# Patient Record
Sex: FEMALE | Race: WHITE | Employment: OTHER | URBAN - METROPOLITAN AREA
[De-identification: names, ages, dates, MRNs, and addresses within clinical notes are randomized per-mention and may not be internally consistent; named-entity substitution may affect disease eponyms.]

---

## 2018-07-25 ENCOUNTER — OFFICE VISIT (OUTPATIENT)
Dept: CARDIOLOGY CLINIC | Age: 73
End: 2018-07-25
Payer: MEDICARE

## 2018-07-25 VITALS
HEIGHT: 62 IN | HEART RATE: 68 BPM | DIASTOLIC BLOOD PRESSURE: 82 MMHG | RESPIRATION RATE: 20 BRPM | WEIGHT: 206 LBS | BODY MASS INDEX: 37.91 KG/M2 | SYSTOLIC BLOOD PRESSURE: 122 MMHG

## 2018-07-25 DIAGNOSIS — I42.8 NONISCHEMIC CARDIOMYOPATHY (HCC): ICD-10-CM

## 2018-07-25 DIAGNOSIS — Z95.810 ICD (IMPLANTABLE CARDIOVERTER-DEFIBRILLATOR) IN PLACE: ICD-10-CM

## 2018-07-25 DIAGNOSIS — I25.10 CORONARY ARTERY DISEASE WITHOUT ANGINA PECTORIS, UNSPECIFIED VESSEL OR LESION TYPE, UNSPECIFIED WHETHER NATIVE OR TRANSPLANTED HEART: Primary | ICD-10-CM

## 2018-07-25 DIAGNOSIS — Z95.5 PRESENCE OF DRUG COATED STENT IN LAD CORONARY ARTERY: ICD-10-CM

## 2018-07-25 PROCEDURE — 99203 OFFICE O/P NEW LOW 30 MIN: CPT | Performed by: INTERNAL MEDICINE

## 2018-07-25 PROCEDURE — 1036F TOBACCO NON-USER: CPT | Performed by: INTERNAL MEDICINE

## 2018-07-25 PROCEDURE — 1090F PRES/ABSN URINE INCON ASSESS: CPT | Performed by: INTERNAL MEDICINE

## 2018-07-25 PROCEDURE — G8400 PT W/DXA NO RESULTS DOC: HCPCS | Performed by: INTERNAL MEDICINE

## 2018-07-25 PROCEDURE — G8598 ASA/ANTIPLAT THER USED: HCPCS | Performed by: INTERNAL MEDICINE

## 2018-07-25 PROCEDURE — G8417 CALC BMI ABV UP PARAM F/U: HCPCS | Performed by: INTERNAL MEDICINE

## 2018-07-25 PROCEDURE — G8427 DOCREV CUR MEDS BY ELIG CLIN: HCPCS | Performed by: INTERNAL MEDICINE

## 2018-07-25 PROCEDURE — 3017F COLORECTAL CA SCREEN DOC REV: CPT | Performed by: INTERNAL MEDICINE

## 2018-07-25 PROCEDURE — 1101F PT FALLS ASSESS-DOCD LE1/YR: CPT | Performed by: INTERNAL MEDICINE

## 2018-07-25 PROCEDURE — 1123F ACP DISCUSS/DSCN MKR DOCD: CPT | Performed by: INTERNAL MEDICINE

## 2018-07-25 PROCEDURE — 93000 ELECTROCARDIOGRAM COMPLETE: CPT | Performed by: INTERNAL MEDICINE

## 2018-07-25 PROCEDURE — 4040F PNEUMOC VAC/ADMIN/RCVD: CPT | Performed by: INTERNAL MEDICINE

## 2018-07-25 RX ORDER — PANTOPRAZOLE SODIUM 40 MG/1
40 TABLET, DELAYED RELEASE ORAL DAILY
COMMUNITY

## 2018-07-25 RX ORDER — SPIRONOLACTONE 25 MG/1
TABLET ORAL
Refills: 0 | COMMUNITY
Start: 2018-07-20 | End: 2021-03-26

## 2018-07-25 RX ORDER — LOSARTAN POTASSIUM 50 MG/1
TABLET ORAL
Refills: 0 | COMMUNITY
Start: 2018-07-20 | End: 2020-03-11

## 2018-07-25 RX ORDER — METOPROLOL SUCCINATE 50 MG/1
50 TABLET, EXTENDED RELEASE ORAL DAILY
COMMUNITY
End: 2021-01-06

## 2018-07-25 RX ORDER — ALBUTEROL SULFATE 1.25 MG/3ML
1 SOLUTION RESPIRATORY (INHALATION) EVERY 6 HOURS PRN
COMMUNITY

## 2018-07-25 RX ORDER — DULOXETIN HYDROCHLORIDE 20 MG/1
20 CAPSULE, DELAYED RELEASE ORAL DAILY
COMMUNITY
End: 2021-03-26

## 2018-07-25 RX ORDER — ROSUVASTATIN CALCIUM 40 MG/1
20 TABLET, COATED ORAL EVERY EVENING
COMMUNITY

## 2018-07-25 RX ORDER — LEVOTHYROXINE SODIUM 0.05 MG/1
75 TABLET ORAL DAILY
COMMUNITY

## 2018-07-25 RX ORDER — MONTELUKAST SODIUM 10 MG/1
10 TABLET ORAL NIGHTLY
COMMUNITY
End: 2021-03-26

## 2018-07-25 RX ORDER — FUROSEMIDE 40 MG/1
40 TABLET ORAL DAILY
Qty: 60 TABLET | Refills: 3 | Status: SHIPPED | OUTPATIENT
Start: 2018-07-25 | End: 2021-04-20

## 2018-07-25 NOTE — PATIENT INSTRUCTIONS
Patient Education        Eating Healthy Foods: Care Instructions  Your Care Instructions    Eating healthy foods can help lower your risk for disease. Healthy food gives you energy and keeps your heart strong, your brain active, your muscles working, and your bones strong. A healthy diet includes a variety of foods from the basic food groups: grains, vegetables, fruits, milk and milk products, and meat and beans. Some people may eat more of their favorite foods from only one food group and, as a result, miss getting the nutrients they need. So, it is important to pay attention not only to what you eat but also to what you are missing from your diet. You can eat a healthy, balanced diet by making a few small changes. Follow-up care is a key part of your treatment and safety. Be sure to make and go to all appointments, and call your doctor if you are having problems. It's also a good idea to know your test results and keep a list of the medicines you take. How can you care for yourself at home? Look at what you eat  · Keep a food diary for a week or two and record everything you eat or drink. Track the number of servings you eat from each food group. · For a balanced diet every day, eat a variety of:  ¨ 6 or more ounce-equivalents of grains, such as cereals, breads, crackers, rice, or pasta, every day. An ounce-equivalent is 1 slice of bread, 1 cup of ready-to-eat cereal, or ½ cup of cooked rice, cooked pasta, or cooked cereal.  ¨ 2½ cups of vegetables, especially:  § Dark-green vegetables such as broccoli and spinach. § Orange vegetables such as carrots and sweet potatoes. § Dry beans (such as albert and kidney beans) and peas (such as lentils). ¨ 2 cups of fresh, frozen, or canned fruit. A small apple or 1 banana or orange equals 1 cup. ¨ 3 cups of nonfat or low-fat milk, yogurt, or other milk products. ¨ 5½ ounces of meat and beans, such as chicken, fish, lean meat, beans, nuts, and seeds.  One egg, 1 tablespoon of peanut butter, ½ ounce nuts or seeds, or ¼ cup of cooked beans equals 1 ounce of meat. · Learn how to read food labels for serving sizes and ingredients. Fast-food and convenience-food meals often contain few or no fruits or vegetables. Make sure you eat some fruits and vegetables to make the meal more nutritious. · Look at your food diary. For each food group, add up what you have eaten and then divide the total by the number of days. This will give you an idea of how much you are eating from each food group. See if you can find some ways to change your diet to make it more healthy. Start small  · Do not try to make dramatic changes to your diet all at once. You might feel that you are missing out on your favorite foods and then be more likely to fail. · Start slowly, and gradually change your habits. Try some of the following:  ¨ Use whole wheat bread instead of white bread. ¨ Use nonfat or low-fat milk instead of whole milk. ¨ Eat brown rice instead of white rice, and eat whole wheat pasta instead of white-flour pasta. ¨ Try low-fat cheeses and low-fat yogurt. ¨ Add more fruits and vegetables to meals and have them for snacks. ¨ Add lettuce, tomato, cucumber, and onion to sandwiches. ¨ Add fruit to yogurt and cereal.  Enjoy food  · You can still eat your favorite foods. You just may need to eat less of them. If your favorite foods are high in fat, salt, and sugar, limit how often you eat them, but do not cut them out entirely. · Eat a wide variety of foods. Make healthy choices when eating out  · The type of restaurant you choose can help you make healthy choices. Even fast-food chains are now offering more low-fat or healthier choices on the menu. · Choose smaller portions, or take half of your meal home. · When eating out, try:  ¨ A veggie pizza with a whole wheat crust or grilled chicken (instead of sausage or pepperoni).   ¨ Pasta with roasted vegetables, grilled chicken, or marinara sauce instead of cream sauce. ¨ A vegetable wrap or grilled chicken wrap. ¨ Broiled or poached food instead of fried or breaded items. Make healthy choices easy  · Buy packaged, prewashed, ready-to-eat fresh vegetables and fruits, such as baby carrots, salad mixes, and chopped or shredded broccoli and cauliflower. · Buy packaged, presliced fruits, such as melon or pineapple. · Choose 100% fruit or vegetable juice instead of soda. Limit juice intake to 4 to 6 oz (½ to ¾ cup) a day. · Blend low-fat yogurt, fruit juice, and canned or frozen fruit to make a smoothie for breakfast or a snack. Where can you learn more? Go to https://[employer]. org and sign in to your [employer] account. Enter R575 in the [employer] box to learn more about \"Eating Healthy Foods: Care Instructions. \"     If you do not have an account, please click on the \"Sign Up Now\" link. Current as of: May 12, 2017  Content Version: 11.6  © 1561-1731 [employer]. Care instructions adapted under license by [facility] [place] ([facility]). If you have questions about a medical condition or this instruction, always ask your healthcare professional. [address] any warranty or liability for your use of this information.

## 2018-07-27 ENCOUNTER — TELEPHONE (OUTPATIENT)
Dept: ADMINISTRATIVE | Age: 73
End: 2018-07-27

## 2018-07-27 NOTE — TELEPHONE ENCOUNTER
New pt per Dr. Katey Santiago scheduled with Dr. Bulmaro Lo for: 8/8/18 - Pt req this day. Records scanned into Epic from [place]. She is s/p MDT ICD and stents. EP History sheet scanned.

## 2018-08-08 ENCOUNTER — OFFICE VISIT (OUTPATIENT)
Dept: NON INVASIVE DIAGNOSTICS | Age: 73
End: 2018-08-08
Payer: MEDICARE

## 2018-08-08 VITALS
WEIGHT: 214 LBS | BODY MASS INDEX: 42.01 KG/M2 | RESPIRATION RATE: 20 BRPM | SYSTOLIC BLOOD PRESSURE: 116 MMHG | HEART RATE: 70 BPM | HEIGHT: 60 IN | DIASTOLIC BLOOD PRESSURE: 72 MMHG

## 2018-08-08 DIAGNOSIS — Z95.810 ICD (IMPLANTABLE CARDIOVERTER-DEFIBRILLATOR) IN PLACE: Primary | ICD-10-CM

## 2018-08-08 PROCEDURE — 93283 PRGRMG EVAL IMPLANTABLE DFB: CPT | Performed by: INTERNAL MEDICINE

## 2018-08-08 PROCEDURE — G8400 PT W/DXA NO RESULTS DOC: HCPCS | Performed by: INTERNAL MEDICINE

## 2018-08-08 PROCEDURE — 1123F ACP DISCUSS/DSCN MKR DOCD: CPT | Performed by: INTERNAL MEDICINE

## 2018-08-08 PROCEDURE — 1090F PRES/ABSN URINE INCON ASSESS: CPT | Performed by: INTERNAL MEDICINE

## 2018-08-08 PROCEDURE — 93290 INTERROG DEV EVAL ICPMS IP: CPT | Performed by: INTERNAL MEDICINE

## 2018-08-08 PROCEDURE — G8427 DOCREV CUR MEDS BY ELIG CLIN: HCPCS | Performed by: INTERNAL MEDICINE

## 2018-08-08 PROCEDURE — 4040F PNEUMOC VAC/ADMIN/RCVD: CPT | Performed by: INTERNAL MEDICINE

## 2018-08-08 PROCEDURE — G8417 CALC BMI ABV UP PARAM F/U: HCPCS | Performed by: INTERNAL MEDICINE

## 2018-08-08 PROCEDURE — G8598 ASA/ANTIPLAT THER USED: HCPCS | Performed by: INTERNAL MEDICINE

## 2018-08-08 PROCEDURE — 3017F COLORECTAL CA SCREEN DOC REV: CPT | Performed by: INTERNAL MEDICINE

## 2018-08-08 PROCEDURE — 1101F PT FALLS ASSESS-DOCD LE1/YR: CPT | Performed by: INTERNAL MEDICINE

## 2018-08-08 PROCEDURE — 1036F TOBACCO NON-USER: CPT | Performed by: INTERNAL MEDICINE

## 2018-08-08 PROCEDURE — 99203 OFFICE O/P NEW LOW 30 MIN: CPT | Performed by: INTERNAL MEDICINE

## 2018-08-08 RX ORDER — CALCIUM CARBONATE 300MG(750)
1 TABLET,CHEWABLE ORAL DAILY
COMMUNITY
End: 2020-03-11

## 2018-08-08 NOTE — PROGRESS NOTES
date: 1971    Smokeless tobacco: Never Used    Alcohol use Yes      Comment: occa./ denies caffeine       Current Outpatient Prescriptions   Medication Sig Dispense Refill    Magnesium 400 MG TABS Take 1 tablet by mouth daily      NONFORMULARY Take 1 tablet by mouth daily      losartan (COZAAR) 50 MG tablet TK 1 T PO QD  0    spironolactone (ALDACTONE) 25 MG tablet TK 1 T PO QD  0    pantoprazole (PROTONIX) 40 MG tablet Take 40 mg by mouth daily      montelukast (SINGULAIR) 10 MG tablet Take 10 mg by mouth nightly      levothyroxine (SYNTHROID) 50 MCG tablet Take 50 mcg by mouth Daily      rosuvastatin (CRESTOR) 40 MG tablet Take 40 mg by mouth every evening      aspirin 81 MG tablet Take 81 mg by mouth daily      albuterol (ACCUNEB) 1.25 MG/3ML nebulizer solution Inhale 1 ampule into the lungs every 6 hours as needed for Wheezing      Multiple Vitamins-Minerals (MULTIVITAMIN ADULT PO) Take by mouth daily      OXYGEN Inhale 5 L into the lungs daily      Omega-3 300 MG CAPS Take by mouth daily      DULoxetine (CYMBALTA) 20 MG extended release capsule Take 20 mg by mouth daily      metoprolol succinate (TOPROL XL) 50 MG extended release tablet Take 50 mg by mouth daily      furosemide (LASIX) 40 MG tablet Take 1 tablet by mouth daily Takes every other day 60 tablet 3     No current facility-administered medications for this visit. Allergies   Allergen Reactions    Lisinopril Swelling       ROS:   Constitutional: Negative for fever, activity change and appetite change. HENT: Negative for nosebleeds. Eyes: Negative for visual disturbance. Respiratory: Negative for cough, chest tightness, + shortness of breath and - wheezing. Cardiovascular: per HPI  Gastrointestinal: Negative for abdominal pain and blood in stool. Genitourinary: Negative for hematuria and difficulty urinating. Musculoskeletal: Negative for myalgias and gait problem. Skin: Negative for color change and rash. Neurological: Negative for syncope and light-headedness. Psychiatric/Behavioral: Negative for confusion and agitation. The patient is not nervous/anxious. Heme: no bleeding disorders, no melena or hematochezia      PHYSICAL EXAM:  Constitutional   Vitals:    18 1426   BP: 116/72   Pulse: 70   Resp: 20   Weight: 214 lb (97.1 kg)   Height: 5' (1.524 m)    Well-developed, no acute distress  Eyes: conjunctivae normal, no xanthelasma   Ears, Nose, Throat: oral mucosa moist, no cyanosis   Neck: supple, no JVD, no bruits, no thyromegaly   CV: normal rate, regular rhythm,  no murmurs, rubs, or gallops. Peripheral pulses normal   Lungs: clear to auscultation bilaterally, normal respiratory effort, no wheezes  Abdomen: soft, non-tender, bowel sounds present, no masses or hepatomegaly   Extremities: no digital clubbing, no edema   Skin: warm, no rashes   Neuro/Psych: A&O x 3, normal mood and affect  Device site: looks well healed and free from infection or erosion    Data:    No results for input(s): WBC, HGB, HCT, PLT in the last 72 hours. No results for input(s): NA, K, CL, CO2, BUN, CREATININE, CALCIUM in the last 72 hours. Invalid input(s): GLU  No results for input(s): INR in the last 72 hours. No results for input(s): TSH in the last 72 hours. 18: Device Interrogation: Battery life: 6.6 years, charge time: 3.7 seconds, Atrial pacin.8%, RV pacing: <1%, Episodes: 1 AF episode on 10/12/17-lasting 28 seconds, Vrate 153 bpm, AF burden: <1%. Programming device evaluation (in person) with iterative adjustment of the implantable device was performed to test the function of the device and to select optimal permanent programmed values with analysis, review and report, and I personally supervised this procedure and reviewed all data today. Impression:     1. Ischemic cardiomyopathy  - GDMT per cardiology  - Following with Dr. Elder López    2.  BiV ICD in-situ  - ICD implantation - - generator

## 2018-08-10 ENCOUNTER — TELEPHONE (OUTPATIENT)
Dept: NON INVASIVE DIAGNOSTICS | Age: 73
End: 2018-08-10

## 2018-08-10 NOTE — TELEPHONE ENCOUNTER
Spoke with Panda Santiago from [facility] device [facility], he transferred patient to our clinic. Accepted transfer in to our clinic. Spoke with patient. Informed her that she is now transferred in our clinic. Patient hasn't had her monitor plugged in since 12/2017. Patient instructed to hook up monitor and send a test transmission at her convenience . Patient voiced understanding.      Chau Wallace

## 2018-08-13 ENCOUNTER — TELEPHONE (OUTPATIENT)
Dept: NON INVASIVE DIAGNOSTICS | Age: 73
End: 2018-08-13

## 2018-11-07 ENCOUNTER — NURSE ONLY (OUTPATIENT)
Dept: NON INVASIVE DIAGNOSTICS | Age: 73
End: 2018-11-07
Payer: MEDICARE

## 2018-11-07 DIAGNOSIS — Z95.810 ICD (IMPLANTABLE CARDIOVERTER-DEFIBRILLATOR) IN PLACE: Primary | ICD-10-CM

## 2018-11-07 DIAGNOSIS — I42.8 NONISCHEMIC CARDIOMYOPATHY (HCC): ICD-10-CM

## 2018-11-07 PROCEDURE — 93296 REM INTERROG EVL PM/IDS: CPT | Performed by: INTERNAL MEDICINE

## 2018-11-07 PROCEDURE — 93297 REM INTERROG DEV EVAL ICPMS: CPT | Performed by: INTERNAL MEDICINE

## 2018-11-07 PROCEDURE — 93295 DEV INTERROG REMOTE 1/2/MLT: CPT | Performed by: INTERNAL MEDICINE

## 2018-11-12 ENCOUNTER — TELEPHONE (OUTPATIENT)
Dept: NON INVASIVE DIAGNOSTICS | Age: 73
End: 2018-11-12

## 2019-01-22 ENCOUNTER — OFFICE VISIT (OUTPATIENT)
Dept: CARDIOLOGY CLINIC | Age: 74
End: 2019-01-22
Payer: MEDICARE

## 2019-01-22 VITALS
HEIGHT: 61 IN | DIASTOLIC BLOOD PRESSURE: 70 MMHG | BODY MASS INDEX: 38.14 KG/M2 | SYSTOLIC BLOOD PRESSURE: 128 MMHG | RESPIRATION RATE: 14 BRPM | HEART RATE: 93 BPM | WEIGHT: 202 LBS

## 2019-01-22 DIAGNOSIS — I42.8 NONISCHEMIC CARDIOMYOPATHY (HCC): Primary | ICD-10-CM

## 2019-01-22 DIAGNOSIS — Z95.5 PRESENCE OF DRUG COATED STENT IN LAD CORONARY ARTERY: ICD-10-CM

## 2019-01-22 DIAGNOSIS — Z95.810 ICD (IMPLANTABLE CARDIOVERTER-DEFIBRILLATOR) IN PLACE: ICD-10-CM

## 2019-01-22 PROCEDURE — 1090F PRES/ABSN URINE INCON ASSESS: CPT | Performed by: INTERNAL MEDICINE

## 2019-01-22 PROCEDURE — G8484 FLU IMMUNIZE NO ADMIN: HCPCS | Performed by: INTERNAL MEDICINE

## 2019-01-22 PROCEDURE — 4040F PNEUMOC VAC/ADMIN/RCVD: CPT | Performed by: INTERNAL MEDICINE

## 2019-01-22 PROCEDURE — 3017F COLORECTAL CA SCREEN DOC REV: CPT | Performed by: INTERNAL MEDICINE

## 2019-01-22 PROCEDURE — 93000 ELECTROCARDIOGRAM COMPLETE: CPT | Performed by: INTERNAL MEDICINE

## 2019-01-22 PROCEDURE — 99213 OFFICE O/P EST LOW 20 MIN: CPT | Performed by: INTERNAL MEDICINE

## 2019-01-22 PROCEDURE — G8417 CALC BMI ABV UP PARAM F/U: HCPCS | Performed by: INTERNAL MEDICINE

## 2019-01-22 PROCEDURE — G8400 PT W/DXA NO RESULTS DOC: HCPCS | Performed by: INTERNAL MEDICINE

## 2019-01-22 PROCEDURE — 1036F TOBACCO NON-USER: CPT | Performed by: INTERNAL MEDICINE

## 2019-01-22 PROCEDURE — 1101F PT FALLS ASSESS-DOCD LE1/YR: CPT | Performed by: INTERNAL MEDICINE

## 2019-01-22 PROCEDURE — 1123F ACP DISCUSS/DSCN MKR DOCD: CPT | Performed by: INTERNAL MEDICINE

## 2019-01-22 PROCEDURE — G8427 DOCREV CUR MEDS BY ELIG CLIN: HCPCS | Performed by: INTERNAL MEDICINE

## 2019-02-06 ENCOUNTER — NURSE ONLY (OUTPATIENT)
Dept: NON INVASIVE DIAGNOSTICS | Age: 74
End: 2019-02-06
Payer: MEDICARE

## 2019-02-06 DIAGNOSIS — Z95.810 ICD (IMPLANTABLE CARDIOVERTER-DEFIBRILLATOR) IN PLACE: Primary | ICD-10-CM

## 2019-02-06 PROCEDURE — 93296 REM INTERROG EVL PM/IDS: CPT | Performed by: INTERNAL MEDICINE

## 2019-02-06 PROCEDURE — 93295 DEV INTERROG REMOTE 1/2/MLT: CPT | Performed by: INTERNAL MEDICINE

## 2019-05-08 ENCOUNTER — HOSPITAL ENCOUNTER (OUTPATIENT)
Age: 74
Setting detail: OUTPATIENT SURGERY
Discharge: HOME | End: 2019-05-08
Attending: SURGERY | Admitting: SURGERY
Payer: MEDICARE

## 2019-05-08 ENCOUNTER — NURSE ONLY (OUTPATIENT)
Dept: NON INVASIVE DIAGNOSTICS | Age: 74
End: 2019-05-08
Payer: MEDICARE

## 2019-05-08 VITALS
DIASTOLIC BLOOD PRESSURE: 53 MMHG | BODY MASS INDEX: 38.51 KG/M2 | WEIGHT: 204 LBS | SYSTOLIC BLOOD PRESSURE: 134 MMHG | TEMPERATURE: 98 F | OXYGEN SATURATION: 100 % | HEIGHT: 61 IN | HEART RATE: 76 BPM | RESPIRATION RATE: 16 BRPM

## 2019-05-08 DIAGNOSIS — M65.341 TRIGGER RING FINGER OF RIGHT HAND: Primary | ICD-10-CM

## 2019-05-08 DIAGNOSIS — I50.22 CHRONIC SYSTOLIC HEART FAILURE (HCC): ICD-10-CM

## 2019-05-08 DIAGNOSIS — I42.8 NONISCHEMIC CARDIOMYOPATHY (HCC): ICD-10-CM

## 2019-05-08 DIAGNOSIS — Z95.810 ICD (IMPLANTABLE CARDIOVERTER-DEFIBRILLATOR) IN PLACE: Primary | ICD-10-CM

## 2019-05-08 PROCEDURE — 3600000002 HC SURGERY LEVEL 2 BASE: Performed by: SURGERY

## 2019-05-08 PROCEDURE — 93297 REM INTERROG DEV EVAL ICPMS: CPT | Performed by: INTERNAL MEDICINE

## 2019-05-08 PROCEDURE — 2500000003 HC RX 250 WO HCPCS: Performed by: SURGERY

## 2019-05-08 PROCEDURE — 93295 DEV INTERROG REMOTE 1/2/MLT: CPT | Performed by: INTERNAL MEDICINE

## 2019-05-08 PROCEDURE — 7100000010 HC PHASE II RECOVERY - FIRST 15 MIN: Performed by: SURGERY

## 2019-05-08 PROCEDURE — 88304 TISSUE EXAM BY PATHOLOGIST: CPT

## 2019-05-08 PROCEDURE — 7100000011 HC PHASE II RECOVERY - ADDTL 15 MIN: Performed by: SURGERY

## 2019-05-08 PROCEDURE — 6360000002 HC RX W HCPCS: Performed by: SURGERY

## 2019-05-08 PROCEDURE — 2580000003 HC RX 258: Performed by: SURGERY

## 2019-05-08 PROCEDURE — 93296 REM INTERROG EVL PM/IDS: CPT | Performed by: INTERNAL MEDICINE

## 2019-05-08 PROCEDURE — 3600000012 HC SURGERY LEVEL 2 ADDTL 15MIN: Performed by: SURGERY

## 2019-05-08 PROCEDURE — 2580000003 HC RX 258: Performed by: ANESTHESIOLOGY

## 2019-05-08 PROCEDURE — 2709999900 HC NON-CHARGEABLE SUPPLY: Performed by: SURGERY

## 2019-05-08 RX ORDER — OXYCODONE HYDROCHLORIDE AND ACETAMINOPHEN 5; 325 MG/1; MG/1
1 TABLET ORAL EVERY 6 HOURS PRN
Qty: 6 TABLET | Refills: 0 | Status: SHIPPED | OUTPATIENT
Start: 2019-05-08 | End: 2019-05-15

## 2019-05-08 RX ORDER — HYDROCODONE BITARTRATE AND ACETAMINOPHEN 5; 325 MG/1; MG/1
1 TABLET ORAL EVERY 4 HOURS PRN
Qty: 6 TABLET | Refills: 0 | Status: SHIPPED | OUTPATIENT
Start: 2019-05-08 | End: 2019-05-15

## 2019-05-08 RX ORDER — SODIUM CHLORIDE, SODIUM LACTATE, POTASSIUM CHLORIDE, CALCIUM CHLORIDE 600; 310; 30; 20 MG/100ML; MG/100ML; MG/100ML; MG/100ML
INJECTION, SOLUTION INTRAVENOUS CONTINUOUS
Status: DISCONTINUED | OUTPATIENT
Start: 2019-05-08 | End: 2019-05-08 | Stop reason: HOSPADM

## 2019-05-08 RX ORDER — CEPHALEXIN 500 MG/1
500 CAPSULE ORAL 3 TIMES DAILY
Qty: 11 CAPSULE | Refills: 0 | Status: SHIPPED | OUTPATIENT
Start: 2019-05-08 | End: 2020-03-11

## 2019-05-08 RX ORDER — CEPHALEXIN 500 MG/1
500 CAPSULE ORAL 3 TIMES DAILY
Qty: 11 CAPSULE | Refills: 0 | Status: SHIPPED | OUTPATIENT
Start: 2019-05-08 | End: 2019-09-19

## 2019-05-08 RX ADMIN — SODIUM CHLORIDE, POTASSIUM CHLORIDE, SODIUM LACTATE AND CALCIUM CHLORIDE: 600; 310; 30; 20 INJECTION, SOLUTION INTRAVENOUS at 09:14

## 2019-05-08 RX ADMIN — CEFAZOLIN 2 G: 1 INJECTION, POWDER, FOR SOLUTION INTRAMUSCULAR; INTRAVENOUS at 09:54

## 2019-05-08 ASSESSMENT — PAIN SCALES - GENERAL
PAINLEVEL_OUTOF10: 0
PAINLEVEL_OUTOF10: 0

## 2019-05-08 ASSESSMENT — PAIN - FUNCTIONAL ASSESSMENT: PAIN_FUNCTIONAL_ASSESSMENT: 0-10

## 2019-05-08 NOTE — BRIEF OP NOTE
Brief Postoperative Note  ______________________________________________________________    Patient: Tiffanie Diggs  YOB: 1945  MRN: [id number]  Date of Procedure: 5/8/2019    Pre-Op Diagnosis: GANGLION OF JOINT/TENDON AND TRIGGER FINGER    Post-Op Diagnosis: Same       Procedure(s):  1. RIGHT RING A1 MASS EXCISION. (CPT 19117  2. RIGHT RING A, PULLEY RELEASE ([id number])    Anesthesia: Anesthesia type not filed in the log.     Surgeon(s):  Jamaal Moy MD.; Hand + Reconstructive Surgery    Assistant: none    Estimated Blood Loss (mL): 3 ml    Complications: None    Specimens:   ID Type Source Tests Collected by Time Destination   A : right ring finger A1 mass Specimen Hand SURGICAL PATHOLOGY Jamaal Moy MD 5/8/2019 1018        Implants:  * No implants in log *    Drains: * No LDAs found *  Findings: see OP    Jamaal Moy MD  Date: 5/8/2019  Time: 10:59 AM

## 2019-05-08 NOTE — H&P
History and Physical    Patient's Name/Date of Birth: Jamir Dwyer / 1945 (86 y.o.)    Date: May 8, 2019     Chief Complaint: right ring finger pain    HPI: 75 yrs of age, right hand dominant, white female, with  One month history of painful right ring finger     Past Medical History:   Diagnosis Date    Asthma     Automatic implantable cardioverter defibrillator at end of life     CAD (coronary artery disease)     Cardiomyopathy ([facility] [address] [facility]     COPD (chronic obstructive pulmonary disease) ([facility] [address]     on O2 4 liters continuously    GERD (gastroesophageal reflux disease)     Hyperlipidemia     Hypertension     ICD (implantable cardioverter-defibrillator) in place 7/25/2018    PONV (postoperative nausea and vomiting)     Presence of drug coated stent in LAD coronary artery 7/25/2018    Pulmonary artery hypertension (HCC)     Sleep apnea     on oxygen 4 liters cont    Thyroid disease     VHD (valvular heart disease)        Past Surgical History:   Procedure Laterality Date    APPENDECTOMY      BACK SURGERY      X2    CARDIAC DEFIBRILLATOR PLACEMENT      medtronic    CATARACT REMOVAL WITH IMPLANT Bilateral     DIAGNOSTIC CARDIAC CATH LAB PROCEDURE  11/01/2017    6 stents    GALLBLADDER SURGERY      HERNIA REPAIR      PACEMAKER PLACEMENT      medtronic    TRANSTHORACIC ECHOCARDIOGRAM  10/05/2017       Prior to Admission medications    Medication Sig Start Date End Date Taking?  Authorizing Provider   ZINC PO Take by mouth daily   Yes Historical Provider, MD   Magnesium 400 MG TABS Take 1 tablet by mouth daily   Yes Historical Provider, MD   losartan (COZAAR) 50 MG tablet TK 1 T PO QD 7/20/18  Yes Historical Provider, MD   spironolactone (ALDACTONE) 25 MG tablet TK 1 T PO QD 7/20/18  Yes Historical Provider, MD   pantoprazole (PROTONIX) 40 MG tablet Take 40 mg by mouth daily   Yes Historical Provider, MD   montelukast (SINGULAIR) 10 MG tablet Take 10 mg by mouth nightly   Yes Historical Provider, MD levothyroxine (SYNTHROID) 50 MCG tablet Take 50 mcg by mouth Daily   Yes Historical Provider, MD   rosuvastatin (CRESTOR) 40 MG tablet Take 40 mg by mouth every evening   Yes Historical Provider, MD   aspirin 81 MG tablet Take 81 mg by mouth daily   Yes Historical Provider, MD   albuterol (ACCUNEB) 1.25 MG/3ML nebulizer solution Inhale 1 ampule into the lungs every 6 hours as needed for Wheezing   Yes Historical Provider, MD   Multiple Vitamins-Minerals (MULTIVITAMIN ADULT PO) Take by mouth daily   Yes Historical Provider, MD   OXYGEN Inhale 4 L into the lungs daily    Yes Historical Provider, MD   Omega-3 300 MG CAPS Take by mouth daily   Yes Historical Provider, MD   DULoxetine (CYMBALTA) 20 MG extended release capsule Take 20 mg by mouth daily   Yes Historical Provider, MD   metoprolol succinate (TOPROL XL) 50 MG extended release tablet Take 50 mg by mouth daily   Yes Historical Provider, MD   furosemide (LASIX) 40 MG tablet Take 1 tablet by mouth daily Takes every other day 18  Yes Jackline Nyhan, MD       Lisinopril    Family History   Problem Relation Age of Onset    Heart Attack Father        Social History     Socioeconomic History    Marital status:      Spouse name: Not on file    Number of children: Not on file    Years of education: Not on file    Highest education level: Not on file   Occupational History    Not on file   Social Needs    Financial resource strain: Not on file    Food insecurity:     Worry: Not on file     Inability: Not on file    Transportation needs:     Medical: Not on file     Non-medical: Not on file   Tobacco Use    Smoking status: Former Smoker     Years: 6.00     Types: Cigarettes     Last attempt to quit: 1970     Years since quittin.3    Smokeless tobacco: Never Used   Substance and Sexual Activity    Alcohol use: Yes     Comment: occas    Drug use: No    Sexual activity: Not on file   Lifestyle    Physical activity:     Days per week: Not and rhythm, normal S1 and S2, no S3 or S4, and no murmur noted  ABDOMEN:  deferred  CHEST/BREASTS:  deferred  GENITAL/URINARY:  deferred  MUSCULOSKELETAL:  RIGHT/left knees, redness, warmth  NEUROLOGIC:  Awake, alert, oriented to name, place and time. Cranial nerves II-XII are grossly intact. Motor is 5 out of 5 bilaterally. Cerebellar finger to nose, heel to shin intact. Sensory is intact. Babinski down going, Romberg negative, and gait is normal.  SKIN:  Bruising mult. places    Assessment:  Active Problems:    * No active hospital problems. *  Resolved Problems:    * No resolved hospital problems. *  1. Right ring finger- A1 mass  2. Right ring finger- trigger    Plan:  1. Right ring finger- A1 mass excision  2. Right ring finger- A1 pulley release.     Electronically signed by Raya Ramirez MD on 5/8/19 at 9:44 AM

## 2019-05-08 NOTE — OP NOTE
Trigger Finger Release    OPERATIVE NOTE:     Indications: This is a 75 y. o. who has been battling a painful locking right ring trigger finger for almost 3 weeks the patient is also noted to have a significant solitary mass of the right ring finger A1 region. The mass is actually tenting up the skin. The mass appears to be spherical, cystic with a very tense capsule. The masses in the order of 5-6 mm. I did obtain verbal informed consent for the outpatient hand surgery, while the patient was in the office. I did meet the patient and the family in the preoperative holding room to again answer questions and outline the operation. I did fran the surgery site with a purple marker and my initials. We did obtain written informed consent in the preoperative holding room. Description : The patient did receive a left upper extremity iv. placement per nursing, and was escorted back to the surgery suite. The patient was laid supine on the operating table, and then underwent  iv. sedation per the Anesthesia Team. The operating room bed was rotated 90 degrees away from anesthesia workbench, and the right upper extremity was abducted 70 degrees away from the patient´s body, and placed onto a padded arm table. We did have nonsterile Webril and an 18-inch tourniquet to the upper arm. The tourniquet was not inflated during the case. Prior to anything invasive, the Surgical Team did perform and official timeout. All members were in accordance. I then provided the right ring finger proper digital nerve block, prior to the prep , utilizing my standard 50/50 mixture of 1% Lidocaine + 0.25% Bupivacaine. We then prepped the right upper extremity with Betadine solution, from fingertips down to the Webril. A sterile field was then created using sterile sheets and sterile towels. I did place a sterile fluff  towel underneath the hand and wrist, for protection throughout the case.      I then identified the anatomical landmarks, namely: The inflamed flexor tendon nodules of the right ring. FDS/FDP (flexor digitorum superficialis/flexor digitorum profundus) tendons, and the corresponding distal palmar transverse skin crease. My standard 1.5 cm transverse incision was then planned with a purple marker. I first tested the patient with a #10 blade scalpel, noting adequate anesthesia. I then incised the skin only with a scalpel. Small punctuate bleeders were not controlled initially, because of the potential superficial location of the underlying neurovascular bundles. I then used an Adson forceps along with blunt and sharp dissection with a Littler scissor, to create a proximal skin and soft tissue flap. I was able to dissect below the dermis and superficial to the superficial palmar fascia. There was noted to be mixed acute and chronic inflammatory changes; the predominance being the acute variety type. I then transitioned to a double-wide skin hook, followed by blunt dissection with the Metzenbaum scissors, breaking up 7 stout inflammatory bands proximally. I then transitioned to create a distal skin and soft tissue flap. Distally, on the right ring finger, I identified the radial proper and ulnar proper neurovascular bundles. These structures were not mobilized, but re-identified, and well protected throughout the case. Proximally, I identified the long/ring and ring/small proper common neurovascular bundles, which were similarly identified, and well protected throughout the rest of the case. I then passively extended and flexed the digit, noting severe grinding/triggering of the inflamed flexor tendon nodules, underneath the distal and leading edges of the [id number]. It was any easy intraoperative decision for complete anterior A1 pulley resection, rather than a simple release. I did use a #15 blade scalpel to incise the radial lateral longitudinal border of the A1 pulley. The mass was actually mid and ulnar as part of the A1 pulley.  I also GLENN.

## 2019-05-17 ENCOUNTER — TELEPHONE (OUTPATIENT)
Dept: NON INVASIVE DIAGNOSTICS | Age: 74
End: 2019-05-17

## 2019-05-17 NOTE — TELEPHONE ENCOUNTER
We have received your remote transmission. Our staff will contact you if there is anything that needs to be discussed. Your next appointment is August 7, 2019 for remote transmission. Patient verbalized understanding.

## 2019-05-17 NOTE — PROGRESS NOTES
See [employer] [place] report. Remote monitoring reviewed over a 90 day period. End of 90 day monitoring period date of service 5-8-19.

## 2019-08-07 ENCOUNTER — NURSE ONLY (OUTPATIENT)
Dept: NON INVASIVE DIAGNOSTICS | Age: 74
End: 2019-08-07
Payer: MEDICARE

## 2019-08-07 DIAGNOSIS — Z95.810 ICD (IMPLANTABLE CARDIOVERTER-DEFIBRILLATOR) IN PLACE: Primary | ICD-10-CM

## 2019-08-07 DIAGNOSIS — I42.8 NONISCHEMIC CARDIOMYOPATHY (HCC): ICD-10-CM

## 2019-08-07 PROCEDURE — 93295 DEV INTERROG REMOTE 1/2/MLT: CPT | Performed by: INTERNAL MEDICINE

## 2019-08-07 PROCEDURE — 93296 REM INTERROG EVL PM/IDS: CPT | Performed by: INTERNAL MEDICINE

## 2019-08-19 ENCOUNTER — TELEPHONE (OUTPATIENT)
Dept: NON INVASIVE DIAGNOSTICS | Age: 74
End: 2019-08-19

## 2019-08-19 NOTE — TELEPHONE ENCOUNTER
We have received your remote transmission. Our staff will contact you if there is anything that needs to be discussed. Your next appointment is September 19, 2019 OV with CK. Patient verbalized understanding.

## 2019-09-18 NOTE — PROGRESS NOTES
see below  Overall device function is normal    All device programmable settings were evaluated per above and in the scanned document, along with iterative adjustments (capture thresholds) to assess and select the most appropriate final programming to provide for consistent delivery of the appropriate therapy and to verify function of the device. Impression:     1. Ischemic cardiomyopathy  - GDMT per cardiology  - Followed with Dr. Noemi Reeves    2. ICD in-situ  -  ICD implantation 2007  - Generator change 2014  - Sprint Ras [id number] lead with normal function. Continue to monitor per protocol  - Normal ICD function as above    3. Hypertension  - Well controlled at this visit  - On Cozaar. Aldactone and Toprol XL. 4. Coronary artery disease  - Left heart cath, 4/4/2015, left main 0%. LAD, stents in the proximal and mid segment, patent, minor irregularities. Left circumflex dominant. Mild irregularities. RCA nondominant artery. Proximal RCA stent patent. - Left heart catheterization, 11/1/2017, left main 0%, LAD widely patent stent in the proximal mid and mid to distal segment. Small D1 with a 90% ostial lesion that is jailed. Left circumflex, large and dominant. Appears marginal with an ostial 90% lesion small for PCI, RCA nondominant with patent stents. EF 30-35%  - Followed with     5. Obesity  - Body mass index is 39.41 kg/m². - Encouraged weight loss    6. COPD  - Oxygen dependent  - On nebulizer treatments     7. AMS  - Asymptomatic.   - Less than 0.1% burden. - Defer [facility] at this time. - Continue to monitor. Recommendations:     1. Continue current medication regimen  2. Remotes every 91 days  3. Follow up in one year. Encouraged the patient to call if she has any questions or concerns. Thank you for allowing me to participate in your patient's care.     Saintclair Pon, MD  Cardiac Electrophysiology  [facility]) Physicians  [facility] [place]: [facility]  1:10 PM  9/19/2019

## 2019-09-19 ENCOUNTER — OFFICE VISIT (OUTPATIENT)
Dept: NON INVASIVE DIAGNOSTICS | Age: 74
End: 2019-09-19
Payer: MEDICARE

## 2019-09-19 VITALS
RESPIRATION RATE: 22 BRPM | DIASTOLIC BLOOD PRESSURE: 66 MMHG | SYSTOLIC BLOOD PRESSURE: 114 MMHG | BODY MASS INDEX: 39.01 KG/M2 | HEIGHT: 62 IN | HEART RATE: 80 BPM | WEIGHT: 212 LBS

## 2019-09-19 DIAGNOSIS — I42.8 NONISCHEMIC CARDIOMYOPATHY (HCC): ICD-10-CM

## 2019-09-19 DIAGNOSIS — Z95.810 ICD (IMPLANTABLE CARDIOVERTER-DEFIBRILLATOR) IN PLACE: Primary | ICD-10-CM

## 2019-09-19 PROCEDURE — 1036F TOBACCO NON-USER: CPT | Performed by: INTERNAL MEDICINE

## 2019-09-19 PROCEDURE — G8427 DOCREV CUR MEDS BY ELIG CLIN: HCPCS | Performed by: INTERNAL MEDICINE

## 2019-09-19 PROCEDURE — 93290 INTERROG DEV EVAL ICPMS IP: CPT | Performed by: INTERNAL MEDICINE

## 2019-09-19 PROCEDURE — 99214 OFFICE O/P EST MOD 30 MIN: CPT | Performed by: INTERNAL MEDICINE

## 2019-09-19 PROCEDURE — 1123F ACP DISCUSS/DSCN MKR DOCD: CPT | Performed by: INTERNAL MEDICINE

## 2019-09-19 PROCEDURE — 93283 PRGRMG EVAL IMPLANTABLE DFB: CPT | Performed by: INTERNAL MEDICINE

## 2019-09-19 PROCEDURE — G8400 PT W/DXA NO RESULTS DOC: HCPCS | Performed by: INTERNAL MEDICINE

## 2019-09-19 PROCEDURE — G8417 CALC BMI ABV UP PARAM F/U: HCPCS | Performed by: INTERNAL MEDICINE

## 2019-09-19 PROCEDURE — 1090F PRES/ABSN URINE INCON ASSESS: CPT | Performed by: INTERNAL MEDICINE

## 2019-09-19 PROCEDURE — 3017F COLORECTAL CA SCREEN DOC REV: CPT | Performed by: INTERNAL MEDICINE

## 2019-09-19 PROCEDURE — 4040F PNEUMOC VAC/ADMIN/RCVD: CPT | Performed by: INTERNAL MEDICINE

## 2019-09-19 RX ORDER — FERROUS SULFATE 325(65) MG
325 TABLET ORAL 2 TIMES DAILY
COMMUNITY

## 2019-11-06 ENCOUNTER — NURSE ONLY (OUTPATIENT)
Dept: NON INVASIVE DIAGNOSTICS | Age: 74
End: 2019-11-06
Payer: MEDICARE

## 2019-11-06 DIAGNOSIS — Z95.810 ICD (IMPLANTABLE CARDIOVERTER-DEFIBRILLATOR) IN PLACE: Primary | ICD-10-CM

## 2019-11-06 DIAGNOSIS — I42.8 NONISCHEMIC CARDIOMYOPATHY (HCC): ICD-10-CM

## 2019-11-06 PROCEDURE — 93296 REM INTERROG EVL PM/IDS: CPT | Performed by: INTERNAL MEDICINE

## 2019-11-06 PROCEDURE — 93295 DEV INTERROG REMOTE 1/2/MLT: CPT | Performed by: INTERNAL MEDICINE

## 2019-11-06 PROCEDURE — 93297 REM INTERROG DEV EVAL ICPMS: CPT | Performed by: INTERNAL MEDICINE

## 2019-12-04 ENCOUNTER — TELEPHONE (OUTPATIENT)
Dept: CARDIOLOGY CLINIC | Age: 74
End: 2019-12-04

## 2020-02-05 ENCOUNTER — NURSE ONLY (OUTPATIENT)
Dept: NON INVASIVE DIAGNOSTICS | Age: 75
End: 2020-02-05
Payer: MEDICARE

## 2020-02-05 PROCEDURE — 93296 REM INTERROG EVL PM/IDS: CPT | Performed by: INTERNAL MEDICINE

## 2020-02-05 PROCEDURE — 93295 DEV INTERROG REMOTE 1/2/MLT: CPT | Performed by: INTERNAL MEDICINE

## 2020-02-10 NOTE — PROGRESS NOTES
See [employer] [place] report. Remote monitoring reviewed over a 90 day period. End of 90 day monitoring period date of service 2-5-20. Optivol increasing since mid December. Forwarded to Dr. Nicolasa Best.     Rima Mcnulty RN  [facility] [address]

## 2020-02-19 ENCOUNTER — TELEPHONE (OUTPATIENT)
Dept: NON INVASIVE DIAGNOSTICS | Age: 75
End: 2020-02-19

## 2020-02-19 NOTE — TELEPHONE ENCOUNTER
----- Message from Gertie Mcardle sent at 2/18/2020  3:21 PM EST -----      ----- Message -----  From: Heydi Granger RN  Sent: 2/10/2020  12:21 PM EST  To: Dav Larose MD    Hi, Reviewed this patient's routine remote transmission from 2-5-20 and her Optivol has been elevating since mid December. Thanks, Virgie Mittal @ [facility].

## 2020-03-11 ENCOUNTER — OFFICE VISIT (OUTPATIENT)
Dept: CARDIOLOGY CLINIC | Age: 75
End: 2020-03-11
Payer: MEDICARE

## 2020-03-11 VITALS
DIASTOLIC BLOOD PRESSURE: 70 MMHG | BODY MASS INDEX: 41.88 KG/M2 | SYSTOLIC BLOOD PRESSURE: 114 MMHG | HEIGHT: 61 IN | OXYGEN SATURATION: 96 % | WEIGHT: 221.8 LBS | RESPIRATION RATE: 22 BRPM | HEART RATE: 78 BPM

## 2020-03-11 PROCEDURE — 93000 ELECTROCARDIOGRAM COMPLETE: CPT | Performed by: INTERNAL MEDICINE

## 2020-03-11 PROCEDURE — 1123F ACP DISCUSS/DSCN MKR DOCD: CPT | Performed by: INTERNAL MEDICINE

## 2020-03-11 PROCEDURE — 99214 OFFICE O/P EST MOD 30 MIN: CPT | Performed by: INTERNAL MEDICINE

## 2020-03-11 PROCEDURE — G8400 PT W/DXA NO RESULTS DOC: HCPCS | Performed by: INTERNAL MEDICINE

## 2020-03-11 PROCEDURE — 1090F PRES/ABSN URINE INCON ASSESS: CPT | Performed by: INTERNAL MEDICINE

## 2020-03-11 PROCEDURE — 3017F COLORECTAL CA SCREEN DOC REV: CPT | Performed by: INTERNAL MEDICINE

## 2020-03-11 PROCEDURE — G8484 FLU IMMUNIZE NO ADMIN: HCPCS | Performed by: INTERNAL MEDICINE

## 2020-03-11 PROCEDURE — 1036F TOBACCO NON-USER: CPT | Performed by: INTERNAL MEDICINE

## 2020-03-11 PROCEDURE — G8417 CALC BMI ABV UP PARAM F/U: HCPCS | Performed by: INTERNAL MEDICINE

## 2020-03-11 PROCEDURE — G8427 DOCREV CUR MEDS BY ELIG CLIN: HCPCS | Performed by: INTERNAL MEDICINE

## 2020-03-11 PROCEDURE — 4040F PNEUMOC VAC/ADMIN/RCVD: CPT | Performed by: INTERNAL MEDICINE

## 2020-03-11 ASSESSMENT — ENCOUNTER SYMPTOMS
ABDOMINAL PAIN: 0
DIARRHEA: 0
SHORTNESS OF BREATH: 0
WHEEZING: 0
BLOOD IN STOOL: 0
NAUSEA: 0
VOMITING: 0
BACK PAIN: 0
COUGH: 1
CONSTIPATION: 0

## 2020-03-11 NOTE — PROGRESS NOTES
Sexual Activity    Alcohol use: Yes     Comment: occas    Drug use: No    Sexual activity: Not on file   Lifestyle    Physical activity     Days per week: Not on file     Minutes per session: Not on file    Stress: Not on file   Relationships    Social connections     Talks on phone: Not on file     Gets together: Not on file     Attends Sabianism service: Not on file     Active member of club or organization: Not on file     Attends meetings of clubs or organizations: Not on file     Relationship status: Not on file    Intimate partner violence     Fear of current or ex partner: Not on file     Emotionally abused: Not on file     Physically abused: Not on file     Forced sexual activity: Not on file   Other Topics Concern    Not on file   Social History Narrative    Not on file       Allergies:   Allergies   Allergen Reactions    Lisinopril Swelling       Current Medications:  Current Outpatient Medications   Medication Sig Dispense Refill    ferrous sulfate 325 (65 Fe) MG tablet Take 325 mg by mouth 2 times daily      cephALEXin (KEFLEX) 500 MG capsule Take 1 capsule by mouth 3 times daily 11 capsule 0    Magnesium 400 MG TABS Take 1 tablet by mouth daily      losartan (COZAAR) 50 MG tablet TK 1 T PO QD  0    spironolactone (ALDACTONE) 25 MG tablet TK 1 T PO QD  0    pantoprazole (PROTONIX) 40 MG tablet Take 40 mg by mouth daily      montelukast (SINGULAIR) 10 MG tablet Take 10 mg by mouth nightly      levothyroxine (SYNTHROID) 50 MCG tablet Take 50 mcg by mouth Daily      rosuvastatin (CRESTOR) 40 MG tablet Take 40 mg by mouth every evening      aspirin 81 MG tablet Take 81 mg by mouth daily      albuterol (ACCUNEB) 1.25 MG/3ML nebulizer solution Inhale 1 ampule into the lungs every 6 hours as needed for Wheezing      Multiple Vitamins-Minerals (MULTIVITAMIN ADULT PO) Take by mouth daily      OXYGEN Inhale 4 L into the lungs daily       Omega-3 300 MG CAPS Take by mouth daily      DULoxetine (CYMBALTA) 20 MG extended release capsule Take 20 mg by mouth daily      metoprolol succinate (TOPROL XL) 50 MG extended release tablet Take 50 mg by mouth daily      furosemide (LASIX) 40 MG tablet Take 1 tablet by mouth daily Takes every other day (Patient not taking: Reported on 9/19/2019) 60 tablet 3     No current facility-administered medications for this visit. Physical Exam:  Ht 5' 1\" (1.549 m)   Wt 221 lb 12.8 oz (100.6 kg)   BMI 41.91 kg/m²   Wt Readings from Last 3 Encounters:   03/11/20 221 lb 12.8 oz (100.6 kg)   09/19/19 212 lb (96.2 kg)   05/08/19 204 lb (92.5 kg)       Physical Exam  Constitutional:       General: She is not in acute distress. Appearance: She is well-developed. She is obese. Comments: Receiving oxygen. HENT:      Head: Normocephalic and atraumatic. Neck:      Musculoskeletal: Neck supple. Vascular: No carotid bruit or JVD. Cardiovascular:      Rate and Rhythm: Normal rate and regular rhythm. Heart sounds: No murmur. No friction rub. No gallop. Comments: Distant heart sounds. Pulmonary:      Breath sounds: No wheezing or rales. Comments: Decreased air entry bilaterally with prolonged expiratory phase with no wheezing or crackles. Chest:      Chest wall: No tenderness. Abdominal:      General: Bowel sounds are normal. There is no distension. Palpations: Abdomen is soft. There is no mass. Tenderness: There is no abdominal tenderness. Comments: No abdominal bruit. Musculoskeletal:      Left lower leg: Edema present. Comments: Mild bilateral ankle edema. Skin:     General: Skin is warm and dry. Neurological:      Mental Status: She is alert and oriented to person, place, and time.            Cardiac Tests:    Cardiac catheterization: Done in November 2017 revealed patent proximal to mid LAD stent, small diagonal branch jailed by the stent with 90% ostial stenosis, nondominant RCA, small OM1 with 90%

## 2020-05-07 ENCOUNTER — NURSE ONLY (OUTPATIENT)
Dept: NON INVASIVE DIAGNOSTICS | Age: 75
End: 2020-05-07
Payer: MEDICARE

## 2020-05-07 PROCEDURE — 93295 DEV INTERROG REMOTE 1/2/MLT: CPT | Performed by: INTERNAL MEDICINE

## 2020-05-07 PROCEDURE — 93296 REM INTERROG EVL PM/IDS: CPT | Performed by: INTERNAL MEDICINE

## 2020-08-05 ENCOUNTER — NURSE ONLY (OUTPATIENT)
Dept: NON INVASIVE DIAGNOSTICS | Age: 75
End: 2020-08-05

## 2020-08-05 PROCEDURE — 93297 REM INTERROG DEV EVAL ICPMS: CPT | Performed by: INTERNAL MEDICINE

## 2020-08-05 PROCEDURE — 93295 DEV INTERROG REMOTE 1/2/MLT: CPT | Performed by: INTERNAL MEDICINE

## 2020-08-05 PROCEDURE — 93296 REM INTERROG EVL PM/IDS: CPT | Performed by: INTERNAL MEDICINE

## 2020-08-12 NOTE — PROGRESS NOTES
See [employer] [place] report. Remote monitoring reviewed over a 90 day period. End of 90 day monitoring period date of service 8-6-2020.

## 2020-11-05 ENCOUNTER — NURSE ONLY (OUTPATIENT)
Dept: NON INVASIVE DIAGNOSTICS | Age: 75
End: 2020-11-05
Payer: MEDICARE

## 2020-11-05 PROCEDURE — 93297 REM INTERROG DEV EVAL ICPMS: CPT | Performed by: INTERNAL MEDICINE

## 2020-11-05 PROCEDURE — 93295 DEV INTERROG REMOTE 1/2/MLT: CPT | Performed by: INTERNAL MEDICINE

## 2020-11-05 PROCEDURE — 93296 REM INTERROG EVL PM/IDS: CPT | Performed by: INTERNAL MEDICINE

## 2020-11-18 NOTE — PROGRESS NOTES
See [employer] [facility] report. Remote monitoring reviewed over a 90 day period. End of 90 day monitoring period date of service 11.5.2020. Eliza Ochoa

## 2020-11-19 ENCOUNTER — TELEPHONE (OUTPATIENT)
Dept: NON INVASIVE DIAGNOSTICS | Age: 75
End: 2020-11-19

## 2020-11-19 NOTE — TELEPHONE ENCOUNTER
Phone call from patient asking about battery longevity for her device. Reviewed most recent information from her tx with her.      Kilo Hall RN, BSN  [place]

## 2021-01-06 ENCOUNTER — OFFICE VISIT (OUTPATIENT)
Dept: CARDIOLOGY CLINIC | Age: 76
End: 2021-01-06
Payer: MEDICARE

## 2021-01-06 VITALS
RESPIRATION RATE: 26 BRPM | DIASTOLIC BLOOD PRESSURE: 72 MMHG | BODY MASS INDEX: 40.22 KG/M2 | HEART RATE: 94 BPM | HEIGHT: 61 IN | WEIGHT: 213 LBS | SYSTOLIC BLOOD PRESSURE: 136 MMHG

## 2021-01-06 DIAGNOSIS — I42.8 NONISCHEMIC CARDIOMYOPATHY (HCC): Primary | ICD-10-CM

## 2021-01-06 PROCEDURE — G8400 PT W/DXA NO RESULTS DOC: HCPCS | Performed by: INTERNAL MEDICINE

## 2021-01-06 PROCEDURE — 1123F ACP DISCUSS/DSCN MKR DOCD: CPT | Performed by: INTERNAL MEDICINE

## 2021-01-06 PROCEDURE — G8484 FLU IMMUNIZE NO ADMIN: HCPCS | Performed by: INTERNAL MEDICINE

## 2021-01-06 PROCEDURE — 99214 OFFICE O/P EST MOD 30 MIN: CPT | Performed by: INTERNAL MEDICINE

## 2021-01-06 PROCEDURE — 1090F PRES/ABSN URINE INCON ASSESS: CPT | Performed by: INTERNAL MEDICINE

## 2021-01-06 PROCEDURE — 1036F TOBACCO NON-USER: CPT | Performed by: INTERNAL MEDICINE

## 2021-01-06 PROCEDURE — G8427 DOCREV CUR MEDS BY ELIG CLIN: HCPCS | Performed by: INTERNAL MEDICINE

## 2021-01-06 PROCEDURE — G8417 CALC BMI ABV UP PARAM F/U: HCPCS | Performed by: INTERNAL MEDICINE

## 2021-01-06 PROCEDURE — 93000 ELECTROCARDIOGRAM COMPLETE: CPT | Performed by: INTERNAL MEDICINE

## 2021-01-06 PROCEDURE — 4040F PNEUMOC VAC/ADMIN/RCVD: CPT | Performed by: INTERNAL MEDICINE

## 2021-01-06 RX ORDER — METOPROLOL SUCCINATE 50 MG/1
75 TABLET, EXTENDED RELEASE ORAL DAILY
Qty: 45 TABLET | Refills: 5 | Status: SHIPPED
Start: 2021-01-06 | End: 2021-01-07 | Stop reason: SDUPTHER

## 2021-01-06 RX ORDER — MULTIVIT-MIN/IRON/FOLIC ACID/K 18-600-40
CAPSULE ORAL
COMMUNITY

## 2021-01-06 RX ORDER — ACETAMINOPHEN 500 MG
500 TABLET ORAL EVERY 6 HOURS PRN
COMMUNITY
End: 2021-03-26

## 2021-01-06 ASSESSMENT — ENCOUNTER SYMPTOMS
DIARRHEA: 0
CONSTIPATION: 0
BACK PAIN: 0
ABDOMINAL PAIN: 0
BLOOD IN STOOL: 0
SHORTNESS OF BREATH: 1
NAUSEA: 0
WHEEZING: 0
COUGH: 1
VOMITING: 0

## 2021-01-06 NOTE — PROGRESS NOTES
OUTPATIENT CARDIOLOGY FOLLOW-UP    HPI:    Name: Matheus Reasons    Age: 67 y.o. Primary Care Physician: Eladio Werner DO    Date of Service: 1/6/2021    Chief Complaint:   Chief Complaint   Patient presents with    Cardiomyopathy     10 mo ov     Coronary Artery Disease        History of present illness : 67-year-old female who comes today for follow-up visit accompanied by her . She was seen in my office on 3/11/2020. She has history of ischemic cardiomyopathy, EF 30 to 35% by heart catheterization done in November 2017 status post 6 stents to her LAD, status post AICD and followed up by Dr. Patricia Crenshaw, PFO, asthma/COPD oxygen dependent, hypertension, hyperlipidemia, hypothyroidism, GERD, chronic kidney disease, anemia and depression. Patient is not active. She complains of chronic dyspnea and dyspnea on exertion. She denies chest discomfort, palpitations, dizziness, syncope to get some lower extremity edema. He was with the head of the bed elevated. EKG done today revealed sinus rhythm at 94 bpm.    Review of Systems:   Review of Systems   Constitutional: Negative for chills, fatigue and fever. Night sweats. HENT: Negative for nosebleeds. Respiratory: Positive for cough and shortness of breath. Negative for wheezing. Cardiovascular: Positive for leg swelling. Gastrointestinal: Negative for abdominal pain, blood in stool, constipation, diarrhea, nausea and vomiting. Genitourinary: Negative for dysuria and hematuria. Musculoskeletal: Negative for back pain, joint swelling and myalgias. Aching. Neurological: Negative for syncope and light-headedness. Psychiatric/Behavioral: The patient is not nervous/anxious.            Past Medical History:  Past Medical History:   Diagnosis Date    Asthma     Automatic implantable cardioverter defibrillator at end of life     CAD (coronary artery disease)     Cardiomyopathy ([facility] [address]  COPD (chronic obstructive pulmonary disease) (HCC)     on O2 4 liters continuously    GERD (gastroesophageal reflux disease)     Hyperlipidemia     Hypertension     ICD (implantable cardioverter-defibrillator) in place 2018    PONV (postoperative nausea and vomiting)     Presence of drug coated stent in LAD coronary artery 2018    Pulmonary artery hypertension (HCC)     Sleep apnea     on oxygen 4 liters cont    Thyroid disease     VHD (valvular heart disease)        Past Surgical History:  Past Surgical History:   Procedure Laterality Date    APPENDECTOMY      BACK SURGERY      X2    CARDIAC DEFIBRILLATOR PLACEMENT      medtronic    CATARACT REMOVAL WITH IMPLANT Bilateral     DIAGNOSTIC CARDIAC CATH LAB PROCEDURE  2017    6 stents    FINGER TRIGGER RELEASE Right 2019    RIGHT RING A, MASS EXCISION. RIGHT RING A, JORGE ALBERTO RELEASE ([id number]) performed by Mendel Schultz MD at [address] ECHOCARDIOGRAM  10/05/2017       Family History:  Family History   Problem Relation Age of Onset    Heart Attack Father        Social History:  Social History     Socioeconomic History    Marital status:      Spouse name: Not on file    Number of children: Not on file    Years of education: Not on file    Highest education level: Not on file   Occupational History    Not on file   Social Needs    Financial resource strain: Not on file    Food insecurity     Worry: Not on file     Inability: Not on file   [employer] needs     Medical: Not on file     Non-medical: Not on file   Tobacco Use    Smoking status: Former Smoker     Packs/day: 2.50     Years: 6.00     Pack years: 15.00     Types: Cigarettes     Quit date: 1970     Years since quittin.0    Smokeless tobacco: Never Used   Substance and Sexual Activity    Alcohol use: Yes     Comment: occas    Drug use:  No  Sexual activity: Not on file   Lifestyle    Physical activity     Days per week: Not on file     Minutes per session: Not on file    Stress: Not on file   Relationships    Social connections     Talks on phone: Not on file     Gets together: Not on file     Attends Bahai service: Not on file     Active member of club or organization: Not on file     Attends meetings of clubs or organizations: Not on file     Relationship status: Not on file    Intimate partner violence     Fear of current or ex partner: Not on file     Emotionally abused: Not on file     Physically abused: Not on file     Forced sexual activity: Not on file   Other Topics Concern    Not on file   Social History Narrative    Drinks diet pops (clear pops)       Allergies:   Allergies   Allergen Reactions    Lisinopril Swelling       Current Medications:  Current Outpatient Medications   Medication Sig Dispense Refill    TELMISARTAN PO Take 40 mg by mouth daily       ferrous sulfate 325 (65 Fe) MG tablet Take 325 mg by mouth 2 times daily      spironolactone (ALDACTONE) 25 MG tablet **ON HOLD PER NEPHROLOGY  0    pantoprazole (PROTONIX) 40 MG tablet Take 40 mg by mouth daily      montelukast (SINGULAIR) 10 MG tablet Take 10 mg by mouth nightly      levothyroxine (SYNTHROID) 50 MCG tablet Take 50 mcg by mouth Daily      rosuvastatin (CRESTOR) 40 MG tablet Take 40 mg by mouth every evening      aspirin 81 MG tablet Take 81 mg by mouth daily      albuterol (ACCUNEB) 1.25 MG/3ML nebulizer solution Inhale 1 ampule into the lungs every 6 hours as needed for Wheezing      Multiple Vitamins-Minerals (MULTIVITAMIN ADULT PO) Take by mouth daily      OXYGEN Inhale 3.5-4 L into the lungs daily       Omega-3 300 MG CAPS Take by mouth daily      DULoxetine (CYMBALTA) 20 MG extended release capsule Take 20 mg by mouth daily      metoprolol succinate (TOPROL XL) 50 MG extended release tablet Take 25 mg by mouth daily  furosemide (LASIX) 40 MG tablet Take 1 tablet by mouth daily Takes every other day (Patient taking differently: Take 40 mg by mouth daily **ON HOLD PER NEPHROLOGY BUT DOES USE AS NEEDED) 60 tablet 3     No current facility-administered medications for this visit. Physical Exam:  Ht 5' 1\" (1.549 m)   BMI 41.91 kg/m²   Wt Readings from Last 3 Encounters:   03/11/20 221 lb 12.8 oz (100.6 kg)   09/19/19 212 lb (96.2 kg)   05/08/19 204 lb (92.5 kg)       Physical Exam  Constitutional:       General: She is not in acute distress. Appearance: She is well-developed. She is obese. Comments: Receiving oxygen. HENT:      Head: Normocephalic and atraumatic. Neck:      Musculoskeletal: Neck supple. Vascular: No carotid bruit or JVD. Cardiovascular:      Rate and Rhythm: Normal rate and regular rhythm. Heart sounds: No murmur. No friction rub. No gallop. Pulmonary:      Breath sounds: No wheezing or rales. Comments: Decreased air entry bilaterally with no wheezing or crackles. Chest:      Chest wall: No tenderness. Abdominal:      General: Bowel sounds are normal. There is no distension. Palpations: Abdomen is soft. There is no mass. Tenderness: There is no abdominal tenderness. Comments: No abdominal bruit. Musculoskeletal:      Right lower leg: Edema present. Left lower leg: Edema present. Comments: Minimal bilateral nonpitting edema. Skin:     General: Skin is warm and dry. Neurological:      Mental Status: She is alert and oriented to person, place, and time. Cardiac Tests:    Cardiac catheterization: Done in November 2017 revealed patent proximal to mid LAD stent, small diagonal branch jailed by the stent with 90% ostial stenosis, nondominant RCA, small OM1 with 90% ostial stenosis, ejection fraction 30 to 35% and LVEDP 10 mmHg. ASSESSMENT / PLAN:  -CAD: Stable with no angina. However, she is not active. -Ischemic cardiomyopathy with moderate systolic dysfunction: Clinically compensated compensated. -AICD: Followed up by Dr. Leon Driver. -Chronic dyspnea on exertion: Multifactorial in origin including pulmonary hypertension due to severe COPD, systolic and diastolic dysfunction, anemia, obesity and deconditioning  -Hypertension: Controlled. -Hyperlipidemia: On Crestor. -COPD: On oxygen therapy.  -Chronic kidney disease.  -Hypothyroidism. -GERD. -Obesity.  -Depression. I will increase her Toprol to 75 mg daily. We will continue her other cardiac medications. Patient was advised to have low-salt diet and to lose weight. We will follow-up at the office in 1 year. The patient's current medication list, allergies, problem list and results of all previously ordered testing were reviewed at today's visit. {  Herminia Seo MD , [facility].   [address] Cardiology

## 2021-01-07 RX ORDER — METOPROLOL SUCCINATE 50 MG/1
75 TABLET, EXTENDED RELEASE ORAL DAILY
Qty: 135 TABLET | Refills: 3 | Status: SHIPPED
Start: 2021-01-07 | End: 2022-02-03 | Stop reason: DRUGHIGH

## 2021-02-04 ENCOUNTER — NURSE ONLY (OUTPATIENT)
Dept: NON INVASIVE DIAGNOSTICS | Age: 76
End: 2021-02-04
Payer: MEDICARE

## 2021-02-04 DIAGNOSIS — I42.8 NONISCHEMIC CARDIOMYOPATHY (HCC): ICD-10-CM

## 2021-02-04 DIAGNOSIS — Z95.810 ICD (IMPLANTABLE CARDIOVERTER-DEFIBRILLATOR) IN PLACE: Primary | ICD-10-CM

## 2021-02-04 DIAGNOSIS — I50.22 CHRONIC SYSTOLIC HEART FAILURE (HCC): ICD-10-CM

## 2021-02-04 PROCEDURE — 93297 REM INTERROG DEV EVAL ICPMS: CPT | Performed by: INTERNAL MEDICINE

## 2021-02-04 PROCEDURE — 93295 DEV INTERROG REMOTE 1/2/MLT: CPT | Performed by: INTERNAL MEDICINE

## 2021-02-04 PROCEDURE — 93296 REM INTERROG EVL PM/IDS: CPT | Performed by: INTERNAL MEDICINE

## 2021-03-01 ENCOUNTER — TELEPHONE (OUTPATIENT)
Dept: NON INVASIVE DIAGNOSTICS | Age: 76
End: 2021-03-01

## 2021-03-01 NOTE — PROGRESS NOTES
See [facility] report. Remote monitoring reviewed over a 90 day period. End of 90 day monitoring period date of service 2.4.2021.

## 2021-03-01 NOTE — TELEPHONE ENCOUNTER
Phone call from patient asking about a bright light from monitor in the middle of the night. Reviewed remote monitoring and most recent results. She is asymptomatic. Will continue to monitor.      Perry Brandon RN, BSN  [place]

## 2021-03-02 ENCOUNTER — TELEPHONE (OUTPATIENT)
Dept: NON INVASIVE DIAGNOSTICS | Age: 76
End: 2021-03-02

## 2021-03-02 NOTE — TELEPHONE ENCOUNTER
----- Message from Eliud Restrepo RN sent at 3/1/2021 12:08 PM EST -----  Due for office visit.  Medtronic device

## 2021-03-03 ENCOUNTER — PREP FOR PROCEDURE (OUTPATIENT)
Dept: SURGERY | Age: 76
End: 2021-03-03

## 2021-03-03 RX ORDER — SODIUM CHLORIDE 9 MG/ML
INJECTION, SOLUTION INTRAVENOUS CONTINUOUS
Status: CANCELLED | OUTPATIENT
Start: 2021-03-03

## 2021-03-26 ENCOUNTER — HOSPITAL ENCOUNTER (OUTPATIENT)
Age: 76
Discharge: HOME | End: 2021-03-28
Payer: MEDICARE

## 2021-03-26 DIAGNOSIS — U07.1 COVID-19: ICD-10-CM

## 2021-03-26 PROCEDURE — U0005 INFEC AGEN DETEC AMPLI PROBE: HCPCS

## 2021-03-26 PROCEDURE — U0003 INFECTIOUS AGENT DETECTION BY NUCLEIC ACID (DNA OR RNA); SEVERE ACUTE RESPIRATORY SYNDROME CORONAVIRUS 2 (SARS-COV-2) (CORONAVIRUS DISEASE [COVID-19]), AMPLIFIED PROBE TECHNIQUE, MAKING USE OF HIGH THROUGHPUT TECHNOLOGIES AS DESCRIBED BY CMS-2020-01-R: HCPCS

## 2021-03-26 RX ORDER — ZAFIRLUKAST 10 MG/1
10 TABLET, FILM COATED ORAL 2 TIMES DAILY
COMMUNITY

## 2021-03-26 RX ORDER — ALBUTEROL SULFATE 90 UG/1
2 AEROSOL, METERED RESPIRATORY (INHALATION) PRN
COMMUNITY
Start: 2020-08-03

## 2021-03-26 RX ORDER — SENNOSIDES 8.6 MG
650 CAPSULE ORAL DAILY
COMMUNITY

## 2021-03-26 RX ORDER — FLUTICASONE PROPIONATE 50 MCG
1 SPRAY, SUSPENSION (ML) NASAL PRN
COMMUNITY

## 2021-03-26 NOTE — PROGRESS NOTES
[address] PRE-ADMISSION TESTING GENERAL INSTRUCTIONS- [facility]-phone number:[phone]    GENERAL INSTRUCTIONS  [x] Antibacterial Soap shower Night before and/or AM of Surgery  [x] Follow bowel prep instructions per surgeon. No liquids/jello that are red or purple color. [x] Nothing by mouth after midnight, including gum, candy, mints, or water. [x] You may brush your teeth, gargle, but do NOT swallow water. [x]No smoking, chewing tobacco, illegal drugs, or alcohol within 24 hours of your surgery. [x] Jewelry, valuables or body piercing's should not be brought to the hospital. All body and/or tongue piercing's must be removed prior to arriving to hospital.  ALL hair pins must be removed. [x] Do not wear makeup, lotions, powders, deodorant. Nail polish as directed by the nurse. [x] Arrange transportation with a responsible adult  to and from the hospital. If you do not have a responsible adult  to transport you, you will need to make arrangements with a medical transportation company (i.e. [employer]. A Uber/taxi/bus is not appropriate unless you are accompanied by a responsible adult ). Arrange for someone to be with you for the remainder of the day and for 24 hours after your procedure due to having had anesthesia. Who will be your  for transportation? Olam Heft, spouse  Who will be staying with you for 24 hrs after your procedure? Olhernando Landrum, spouse  [x] [employer] card and photo ID. [x] Use inhalers the morning of surgery and bring with you to hospital.  [x] Bring copy of living will or healthcare power of  papers to be placed in your electronic record. PARKING INSTRUCTIONS:   [x] Arrival Time: 6:30 a,, you and your  will need to wear a mask. · [x] Parking lot '\"I\"  is located on [facility] [place] (the corner of [facility]). To enter, press the button and the gate will lift.   A free token will be provided to exit the lot. One car per patient is allowed to park in this lot. All other cars are to park on [address] either in the parking garage or the handicap lot. Walk up the front walk to the [facility], the door will be locked an employee will greet you and let you in. EDUCATION INSTRUCTIONS:      .         [x]Pain: Post-op pain is normal and to be expected. You will be asked to rate your pain from 0-10 (a zero is not acceptable-education is needed). Your post-op pain goal is:   [x] Ask your nurse for your pain medication. [x] Other:  Wear loose comfortable clothing  MEDICATION INSTRUCTIONS:  [x]Bring a complete list of your medications, please write the last time you took the medicine, give this list to the nurse. [x] Take the following medications the morning of surgery with 1-2 ounces of water: None   Patient will bring the following day of surgery: 1) PROAIR HFA, and will use the morning of surgery if needed  2) Breo Ellipta 3) metoprolol succinate      Instructed may use albuterol (Accuneb) nebulizer morning of surgery if needed. [x] Patient stopped the Vit D 50 mcg LD 2/28/21. I     [x] Follow physician instructions regarding any blood thinners you may be taking. WHAT TO EXPECT:  [x] The day of surgery you will be greeted and checked in by the [employer]. Please bring your photo ID and insurance card. A nurse will greet you in accordance to the time you are needed in the pre-op area to prepare you for surgery. Please do not be discouraged if you are not greeted in the order you arrive as there are many variables that are involved in patient preparation. Your patience is greatly appreciated as you wait for your nurse. Please bring in items such as: books, magazines, newspapers, electronics, or any other items  to occupy your time in the waiting area.     [x]  Delays may occur with surgery and staff will make a sincere effort to keep you informed of delays. If any delays occur with your procedure, we apologize ahead of time for your inconvenience as we recognize the value of your time.

## 2021-03-27 LAB
SARS-COV-2: NOT DETECTED
SOURCE: NORMAL

## 2021-03-30 ENCOUNTER — ANESTHESIA EVENT (OUTPATIENT)
Dept: ENDOSCOPY | Age: 76
End: 2021-03-30
Payer: MEDICARE

## 2021-03-31 ENCOUNTER — HOSPITAL ENCOUNTER (OUTPATIENT)
Age: 76
Setting detail: OUTPATIENT SURGERY
Discharge: HOME | End: 2021-03-31
Attending: SURGERY | Admitting: SURGERY
Payer: MEDICARE

## 2021-03-31 ENCOUNTER — ANESTHESIA (OUTPATIENT)
Dept: ENDOSCOPY | Age: 76
End: 2021-03-31
Payer: MEDICARE

## 2021-03-31 VITALS
SYSTOLIC BLOOD PRESSURE: 139 MMHG | HEART RATE: 65 BPM | RESPIRATION RATE: 18 BRPM | TEMPERATURE: 97.6 F | BODY MASS INDEX: 38.09 KG/M2 | WEIGHT: 207 LBS | DIASTOLIC BLOOD PRESSURE: 63 MMHG | OXYGEN SATURATION: 100 % | HEIGHT: 62 IN

## 2021-03-31 VITALS
OXYGEN SATURATION: 95 % | SYSTOLIC BLOOD PRESSURE: 143 MMHG | RESPIRATION RATE: 12 BRPM | DIASTOLIC BLOOD PRESSURE: 65 MMHG

## 2021-03-31 DIAGNOSIS — U07.1 COVID-19: Primary | ICD-10-CM

## 2021-03-31 PROCEDURE — 6360000002 HC RX W HCPCS: Performed by: NURSE ANESTHETIST, CERTIFIED REGISTERED

## 2021-03-31 PROCEDURE — 3609010600 HC COLONOSCOPY POLYPECTOMY SNARE/COLD BIOPSY: Performed by: SURGERY

## 2021-03-31 PROCEDURE — 88305 TISSUE EXAM BY PATHOLOGIST: CPT

## 2021-03-31 PROCEDURE — 7100000010 HC PHASE II RECOVERY - FIRST 15 MIN: Performed by: SURGERY

## 2021-03-31 PROCEDURE — 2709999900 HC NON-CHARGEABLE SUPPLY: Performed by: SURGERY

## 2021-03-31 PROCEDURE — 3700000000 HC ANESTHESIA ATTENDED CARE: Performed by: SURGERY

## 2021-03-31 PROCEDURE — 2720000010 HC SURG SUPPLY STERILE: Performed by: SURGERY

## 2021-03-31 PROCEDURE — 3700000001 HC ADD 15 MINUTES (ANESTHESIA): Performed by: SURGERY

## 2021-03-31 PROCEDURE — 2580000003 HC RX 258: Performed by: NURSE ANESTHETIST, CERTIFIED REGISTERED

## 2021-03-31 PROCEDURE — 2580000003 HC RX 258: Performed by: SURGERY

## 2021-03-31 PROCEDURE — 7100000011 HC PHASE II RECOVERY - ADDTL 15 MIN: Performed by: SURGERY

## 2021-03-31 PROCEDURE — 3609009900 HC COLONOSCOPY W/CONTROL BLEEDING ANY METHOD: Performed by: SURGERY

## 2021-03-31 RX ORDER — FENTANYL CITRATE 50 UG/ML
INJECTION, SOLUTION INTRAMUSCULAR; INTRAVENOUS PRN
Status: DISCONTINUED | OUTPATIENT
Start: 2021-03-31 | End: 2021-03-31 | Stop reason: SDUPTHER

## 2021-03-31 RX ORDER — SODIUM CHLORIDE 9 MG/ML
INJECTION, SOLUTION INTRAVENOUS CONTINUOUS PRN
Status: DISCONTINUED | OUTPATIENT
Start: 2021-03-31 | End: 2021-03-31 | Stop reason: SDUPTHER

## 2021-03-31 RX ORDER — ONDANSETRON 2 MG/ML
INJECTION INTRAMUSCULAR; INTRAVENOUS PRN
Status: DISCONTINUED | OUTPATIENT
Start: 2021-03-31 | End: 2021-03-31 | Stop reason: SDUPTHER

## 2021-03-31 RX ORDER — SODIUM CHLORIDE 9 MG/ML
INJECTION, SOLUTION INTRAVENOUS CONTINUOUS
Status: DISCONTINUED | OUTPATIENT
Start: 2021-03-31 | End: 2021-03-31 | Stop reason: HOSPADM

## 2021-03-31 RX ORDER — PROPOFOL 10 MG/ML
INJECTION, EMULSION INTRAVENOUS PRN
Status: DISCONTINUED | OUTPATIENT
Start: 2021-03-31 | End: 2021-03-31 | Stop reason: SDUPTHER

## 2021-03-31 RX ADMIN — FENTANYL CITRATE 25 MCG: 50 INJECTION, SOLUTION INTRAMUSCULAR; INTRAVENOUS at 08:01

## 2021-03-31 RX ADMIN — FENTANYL CITRATE 25 MCG: 50 INJECTION, SOLUTION INTRAMUSCULAR; INTRAVENOUS at 08:16

## 2021-03-31 RX ADMIN — FENTANYL CITRATE 25 MCG: 50 INJECTION, SOLUTION INTRAMUSCULAR; INTRAVENOUS at 08:13

## 2021-03-31 RX ADMIN — ONDANSETRON HYDROCHLORIDE 4 MG: 2 INJECTION, SOLUTION INTRAMUSCULAR; INTRAVENOUS at 07:58

## 2021-03-31 RX ADMIN — SODIUM CHLORIDE: 9 INJECTION, SOLUTION INTRAVENOUS at 07:23

## 2021-03-31 RX ADMIN — PROPOFOL 300 MG: 10 INJECTION, EMULSION INTRAVENOUS at 07:58

## 2021-03-31 RX ADMIN — FENTANYL CITRATE 25 MCG: 50 INJECTION, SOLUTION INTRAMUSCULAR; INTRAVENOUS at 07:58

## 2021-03-31 RX ADMIN — SODIUM CHLORIDE: 9 INJECTION, SOLUTION INTRAVENOUS at 07:52

## 2021-03-31 ASSESSMENT — ENCOUNTER SYMPTOMS: SHORTNESS OF BREATH: 1

## 2021-03-31 ASSESSMENT — PAIN - FUNCTIONAL ASSESSMENT: PAIN_FUNCTIONAL_ASSESSMENT: 0-10

## 2021-03-31 ASSESSMENT — PAIN DESCRIPTION - LOCATION: LOCATION: ABDOMEN

## 2021-03-31 ASSESSMENT — PAIN DESCRIPTION - DESCRIPTORS: DESCRIPTORS: DISCOMFORT;PRESSURE

## 2021-03-31 ASSESSMENT — PAIN SCALES - GENERAL: PAINLEVEL_OUTOF10: 2

## 2021-03-31 ASSESSMENT — PAIN DESCRIPTION - PAIN TYPE: TYPE: SURGICAL PAIN

## 2021-03-31 NOTE — PROGRESS NOTES
Denies any covid symptoms.   Stayed self isolated post covid test./  States had very good bowel prep

## 2021-03-31 NOTE — OP NOTE
Operative Note      Patient: Dre Tse  YOB: 1945  MRN: [id number]    Date of Procedure: 3/31/2021    Pre-Op Diagnosis: LEFT LOWER QUADRANT PAIN    Post-Op Diagnosis: Ascending colon polyp, transverse colon polyp x2, descending colon polyp, diverticulosis, internal hemorrhoids, tortuous colon       Procedure(s):  COLONOSCOPY DIAGNOSTIC with cold forceps polypectomy of ascending colon and snare polypectomy for the remaining colon polyps    Surgeon(s):  Edgar Youngblood MD    Assistant:   * No surgical staff found *    Anesthesia: Monitor Anesthesia Care    Estimated Blood Loss (mL): 2    Complications: none    Specimens:   * No specimens in log *    Implants:  * No implants in log *      Drains: * No LDAs found *        BRIEF HISTORY:  This is a 67 y.o. female who presents with the complaint of left lower quadrant pain. It was recommended the patient undergo a colonoscopy. The risks/benefits/alternatives/expected outcomes were explained the the patient which include, but are not limited, bleeding, perforation, aspiration and further procedures. The patient understands and agreed to proceed. PROCEDURE:  The patient was brought into the endoscopy suite and placed in the left lateral decubitus position. A digital rectal exam was performed after the initiation of LMAC anesthesia and failed to reveal any obstructing masses or lesions. A colonoscope was inserted into the patient's anus and passed through the rectum, sigmoid, descending, transverse, and ascending colon all the way to the level of the cecum with difficulty due to tortuous sigmoid colon. Sigmoid diverticulosis was identified. Visualization of the cecum was confirmed by visualization of the ileo-cecal valve, confluence of the tinea, and by visualization of the light in the RLQ on the anterior abdominal wall. The scope was then withdrawn the entire length of the colon. There were multiple polyps noted.   An ascending colon polyp was removed using a cold forceps polypectomy. Hemostasis was excellent. 2 transverse colon polyps were identified and removed using hot snare polypectomies. A Hemoclip was placed at one of the polyps for hemostasis. A descending colon polyp was identified and removed using a cold snare polypectomy. Hemostasis was excellent. Upon reaching the anus I was unable to retroflex due to decreased sphincter tone but internal nonbleeding hemorrhoids were noted. The scope was straightened and withdrawn entirely. The patient tolerated the procedure well and there were no complications.         Electronically signed by Faby Inman MD on 3/31/2021 at 8:36 AM

## 2021-03-31 NOTE — H&P
Name: Layo Peralta                : 1945 Sex: F  Age: 67 yrs  Acct#:  [id number]          Patient was referred by Poncho Stewart DO. Patient's primary care provider is Poncho Stewart DO.  CC: Patient presents for follow up of. (Follow Up) CT scan    HPI: Follow-up. CT shows diverticulosis, thickening of the rectum, nonspecific nodules in bilateral lungs, right kidney atrophy unchanged from prior exam, no hernias identified. Patient still complains of left lower quadrant pain. Meds Prior to Visit:  Aspirin 81 Low Dose  81 mg  once daily  Albuterol Sulfate  (2.5 mg/3ml) 0.083%   Micardis  40 mg   Metoprolol Succinate ER  50 mg   Rosuvastatin Calcium  40 mg  daily  Pantoprazole Sodium  40 mg  once a day  Levothyroxine Sodium  75 mcg  1 by mouth every day  Breo Ellipta  200-25 mcg/Inh  once daily  Accolate     Crestor     Flonase Allergy Relief     Megared Superior Omega-3 Krill Oil Extra Strength     Proair HFA     One Daily Multivitamin Women     Iron     Tylenol 8 Hour Arthritis Pain     Vitamin D (Cholecalciferol)        Allergies:  Lisinopril    PMH:  Problem List: Left lower quadrant pain  (Health Maintenance)  Medical Problems:  Hypertension, Depression, Hypothyroidism, Chronic Obstructive Pulmonary Disease (COPD)  Surgical Hx:  Removal of Gallbladder - ()  Hernia Repair - ()  Tonsillectomy - ()  Appendectomy - ()  Stomach Stapeling - ()  Back Surgery - ()  Back Surgery - ()  Heart Cath - ()  Defibrilator Inserted - ()  Stent Placed - ()  New Defibrilator Inserted - ()  Hand Surgery - ()  Reviewed, no changes. FH:  Father:  Hypertension  Asthma. Mother:  Cancer. Reviewed, no changes. SH:  Personal Habits:  Tobacco Use: Patient is a former smoker. Alcohol: Current Alcohol Use; Social.Drug Use: Denies Drug Use. Daily Caffeine: Consumes on average 1 soda per day. Reviewed, no changes.   ROS:  Const: Reports fatigue and night sweats, but denies anorexia, anxiety, weight gain and weight loss. Eyes: Denies eye symptoms. ENMT: Reports hearing loss. Reports other nasal symptoms. Denies mouth or throat symptoms. CV: Reports hypertension and other cardiovascular symptoms. Resp: Reports other respiratory symptoms. GI: Denies hepatitis, liver disease and other gastrointestinal symptoms. Musculo: Denies musculoskeletal symptoms. Skin: Denies skin, hair and nail symptoms. Breast: Denies breast problems. Neuro: Denies neurologic symptoms. Psych: Denies depression and substance abuse. Endocrine: Reports kidney disease and thyroid disease but denies diabetes. Hema/Lymph: Denies anemia, blood disease, cancer and past transfusion. Allergy/Immuno: Denies immunosuppression. Reviewed, no changes. Exam:  Const: Appears healthy and well developed. No signs of apparent distress present. Head/Face: Atraumatic, normocephalic on inspection. Eyes: Conjunctivae pink. Pupils equal, round and reactive to light. Sclerae clear and anicteric. ENMT: External ears WNL. External nose WNL. Lips: Appear normal and healthy. Neck: Normal to inspection. Normal to palpation. No masses appreciated. Trachea midline. Thyroid is normal in size. No jugular venous distention. Resp: Respiration rate is normal. No wheezing. Clear to auscultation bilaterally. No rales or rhonchi appreciated over the lungs bilaterally. CV: Rate is regular. Rhythm is regular. S1 is normal. S2 is normal. No heart murmur appreciated. Extremities: No clubbing or cyanosis. No edema of the lower limbs bilaterally. Abdomen: No visible herniations. No abdominal scars. Positive bowel sounds in all quadrants. Abdomen is soft, nontender, and nondistended without guarding, rigidity or rebound tenderness. No palpable abdominal aneurysms present. No palpable hepatosplenomegaly. Lymph: No palpable lymphadenopathy in the cervical, supraclavicular, axillary and inguinal region(s).   Musculo: Walks with a normal gait. Upper Extremities: Normal to inspection. ROM: Full ROM bilaterally. Lower Extremities: Normal to inspection. ROM: Full ROM bilaterally. Skin: Skin warm and dry with no evidence of unusual rashes or suspicious lesions. Neuro: Alert and oriented x3. Mood is normal.  Cranial Nerves: Cranial nerves II-XII grossly intact. Psych: Cognition: Judgment and insight are grossly intact. Data Review:        Assessment #1: Hx R10.32 Left lower quadrant pain   Care Plan:              Comments       :  Recommend colonoscopy for further evaluation of second area of rectum as well as left lower quadrant pain. Risks, benefits, alternatives of the procedure were discussed with the patient. All questions were answered. The endoscopy information sheet was reviewed with her. The patient understands and agrees to proceed.     Assessment #2: Hx R93.5 Abnormal findings on diagnostic imaging of other abdominal regions, including retroperitoneum

## 2021-03-31 NOTE — ANESTHESIA PRE PROCEDURE
Department of Anesthesiology  Preprocedure Note       Name:  Bridget Patterson   Age:  67 y.o.  :  1945                                          MRN:  [id number]         Date:  3/31/2021      Surgeon: Guy Richmond):  Stacy Vazquez MD    Procedure: Procedure(s):  COLONOSCOPY DIAGNOSTIC    Medications prior to admission:   Prior to Admission medications    Medication Sig Start Date End Date Taking? Authorizing Provider   zafirlukast (ACCOLATE) 10 MG tablet Take 10 mg by mouth 2 times daily 2 tablets 2 times daily  Usually takes at noon and HS   Yes Historical Provider, MD   fluticasone (FLONASE) 50 MCG/ACT nasal spray 1 spray by Each Nostril route as needed for Rhinitis   Yes Historical Provider, MD   albuterol sulfate HFA (PROAIR HFA) 108 (90 Base) MCG/ACT inhaler Inhale 2 puffs into the lungs as needed For wheezing 8/3/20  Yes Historical Provider, MD   metoprolol succinate (TOPROL XL) 50 MG extended release tablet Take 1.5 tablets by mouth daily  Patient taking differently: Take 75 mg by mouth daily Takes at noon daily 21  Yes Ignacia Crowe MD   Fluticasone furoate-vilanterol (BREO ELLIPTA) 200-25 MCG/INH AEPB inhaler Inhale into the lungs daily Patient's states takes at noon.    Yes Historical Provider, MD   Cholecalciferol (VITAMIN D) 50 MCG (2000 UT) CAPS capsule Take by mouth Takes once a week   Yes Historical Provider, MD   NONFORMULARY Take by mouth as needed [address]    Yes Historical Provider, MD   TELMISARTAN PO Take 40 mg by mouth daily Takes daily at noon   Yes Historical Provider, MD   ferrous sulfate 325 (65 Fe) MG tablet Take 325 mg by mouth three times daily Takes noon, supper and HS   Yes Historical Provider, MD   pantoprazole (PROTONIX) 40 MG tablet Take 40 mg by mouth daily Takes daily at noon   Yes Historical Provider, MD   levothyroxine (SYNTHROID) 50 MCG tablet Take 50 mcg by mouth Daily 75 mcg daily at noon   Yes Historical Provider, MD   rosuvastatin (CRESTOR) 40 MG tablet Take 40 mg by mouth every evening   Yes Historical Provider, MD   albuterol (ACCUNEB) 1.25 MG/3ML nebulizer solution Inhale 1 ampule into the lungs every 6 hours as needed for Wheezing   Yes Historical Provider, MD   Multiple Vitamins-Minerals (MULTIVITAMIN ADULT PO) Take by mouth daily   Yes Historical Provider, MD   OXYGEN Inhale 2 L/min into the lungs continuous Via n/c   Yes Historical Provider, MD   Omega-3 300 MG CAPS Take by mouth daily   Yes Historical Provider, MD   acetaminophen (TYLENOL) 650 MG extended release tablet Take 650 mg by mouth daily States takes 2 tablets daily    Historical Provider, MD   aspirin 81 MG tablet Take 81 mg by mouth daily Takes in the evening    Historical Provider, MD   furosemide (LASIX) 40 MG tablet Take 1 tablet by mouth daily Takes every other day  Patient not taking: Reported on 1/6/2021 7/25/18   Mariangel Kothari MD       Current medications:    Current Facility-Administered Medications   Medication Dose Route Frequency Provider Last Rate Last Admin    0.9 % sodium chloride infusion   Intravenous Continuous Olivia Gerber MD 75 mL/hr at 03/31/21 0723 New Bag at 03/31/21 0723       Allergies:     Allergies   Allergen Reactions    Lisinopril Swelling    Other      Environmental dust, cigarette smoke, grass       Problem List:    Patient Active Problem List   Diagnosis Code    Nonischemic cardiomyopathy (HCC) I42.8    Presence of drug coated stent in LAD coronary artery Z95.5    ICD (implantable cardioverter-defibrillator) in place Z95.810       Past Medical History:        Diagnosis Date    Asthma     Automatic implantable cardioverter defibrillator at end of life     CAD (coronary artery disease)     Cardiomy[facility] [address]     COPD (chronic obstructive pulmonary disease) ([facility] [address]     on O2 4 liters continuously    GERD (gastroesophageal reflux disease)     Hyperlipidemia     Hypertension     ICD (implantable cardioverter-defibrillator) in place 7/25/2018    PONDAGOBERTO (postoperative nausea and vomiting)     Presence of drug coated stent in LAD coronary artery 2018    Pulmonary artery hypertension (HCC)     Sleep apnea     on oxygen 4 liters cont    Thyroid disease     VHD (valvular heart disease)        Past Surgical History:        Procedure Laterality Date    APPENDECTOMY      BACK SURGERY      X2    CARDIAC DEFIBRILLATOR PLACEMENT      medtronic    CATARACT REMOVAL WITH IMPLANT Bilateral     DIAGNOSTIC CARDIAC CATH LAB PROCEDURE  2017    6 stents    FINGER TRIGGER RELEASE Right 2019    RIGHT RING A, MASS EXCISION.  RIGHT RING A, PULLEY RELEASE ([id number]) performed by Aure Masters MD at [address] [place] [address] ECHOCARDIOGRAM  10/05/2017       Social History:    Social History     Tobacco Use    Smoking status: Former Smoker     Packs/day: 2.50     Years: 6.00     Pack years: 15.00     Types: Cigarettes     Quit date: 1970     Years since quittin.2    Smokeless tobacco: Never Used   Substance Use Topics    Alcohol use: Yes     Comment: occas                                Counseling given: Not Answered      Vital Signs (Current):   Vitals:    21 1347 21 0658   BP:  (!) 170/98   Pulse:  70   Resp:  20   Temp:  36.6 °C (97.8 °F)   TempSrc:  Temporal   SpO2:  98%   Weight: 207 lb (93.9 kg) 207 lb (93.9 kg)   Height: 5' 1.5\" (1.562 m) 5' 1.5\" (1.562 m)                                              BP Readings from Last 3 Encounters:   21 (!) 170/98   21 136/72   20 114/70       NPO Status: Time of last liquid consumption:                         Time of last solid consumption:                         Date of last liquid consumption: 21                        Date of last solid food consumption: 21    BMI:   Wt Readings from Last 3 Encounters:   21 207 lb (93.9 kg)   21 213 lb (96.6 kg)   03/11/20 221 lb 12.8 oz (100.6 kg)     Body mass index is 38.48 kg/m². CBC: No results found for: WBC, RBC, HGB, HCT, MCV, RDW, PLT    CMP: No results found for: NA, K, CL, CO2, BUN, CREATININE, GFRAA, AGRATIO, LABGLOM, GLUCOSE, PROT, CALCIUM, BILITOT, ALKPHOS, AST, ALT    POC Tests: No results for input(s): POCGLU, POCNA, POCK, POCCL, POCBUN, POCHEMO, POCHCT in the last 72 hours. Coags: No results found for: PROTIME, INR, APTT    HCG (If Applicable): No results found for: PREGTESTUR, PREGSERUM, HCG, HCGQUANT     ABGs: No results found for: PHART, PO2ART, MKB0GNA, AZM7ZHA, BEART, V4ZCPNMJ     Type & Screen (If Applicable):  No results found for: LABABO, LABRH    Drug/Infectious Status (If Applicable):  No results found for: HIV, HEPCAB    COVID-19 Screening (If Applicable):   Lab Results   Component Value Date    COVID19 Not Detected 03/26/2021           Anesthesia Evaluation  Patient summary reviewed and Nursing notes reviewed   history of anesthetic complications: PONV. Airway: Mallampati: II        Dental:    (+) upper dentures and lower dentures      Pulmonary:   (+) COPD:  shortness of breath: chronic,  sleep apnea: on noncompliant,  decreased breath sounds,  asthma:                           ROS comment: On 2-4L baseline   Cardiovascular:    (+) hypertension:, pacemaker: AICD, CAD: obstructive, CABG/stent ( stentx6 (2010)): no interval change, CHF: systolic and diastolic, ANNE:, pulmonary hypertension:,       ECG reviewed  Rhythm: regular  Rate: normal                 ROS comment: Ischemic cardiomyopathy  EF 30%     Neuro/Psych:   (+) depression/anxiety             GI/Hepatic/Renal:   (+) GERD:, renal disease: CRI,           Endo/Other:    (+) hypothyroidism::., .                 Abdominal:   (+) obese,         Vascular: negative vascular ROS.                               EF 30 to 35% by heart catheterization done in November 2017 status post 6 stents to her LAD, status post AICD and followed up by Dr. Ryan Castillo, PFO          Anesthesia Plan      MAC     ASA 4       Induction: intravenous. Anesthetic plan and risks discussed with patient. Plan discussed with attending and CRNA. PATRICE Wilde - CRNA   3/31/2021    Patient seen and examined, chart reviewed, agree with above findings. Anesthetic plan, risks, benefits, alternatives, and personnel involved discussed with patient. Patient verbalized an understanding and agreed to proceed. NPO status confirmed. Anesthetic plan discussed with care team members and agreed upon.     Matt Myers DO   3/31/2021  7:37 AM

## 2021-04-01 NOTE — ANESTHESIA POSTPROCEDURE EVALUATION
Department of Anesthesiology  Postprocedure Note    Patient: Forrest Bob  MRN: [id number]  YOB: 1945  Date of evaluation: 3/31/2021  Time:  9:25 PM     Procedure Summary     Date: 03/31/21 Room / Location: [facility] 01 / [facility]    Anesthesia Start: 7205 Anesthesia Stop: 9876    Procedures:       COLONOSCOPY POLYPECTOMY SNARE/COLD BIOPSY (N/A )      COLONOSCOPY CONTROL HEMORRHAGE Diagnosis: (LEFT LOWER QUADRANT PAIN)    Surgeons: Raul Webb MD Responsible Provider: Garett Henson DO    Anesthesia Type: MAC ASA Status: 4          Anesthesia Type: MAC    Stormy Phase I: Stormy Score: 9    Stormy Phase II: Stormy Score: 9    Last vitals: Reviewed and per EMR flowsheets.        Anesthesia Post Evaluation    Patient location during evaluation: PACU  Patient participation: complete - patient participated  Level of consciousness: awake and alert  Pain score: 1  Airway patency: patent  Nausea & Vomiting: no nausea and no vomiting  Complications: no  Cardiovascular status: hemodynamically stable  Respiratory status: acceptable  Hydration status: euvolemic

## 2021-04-20 ENCOUNTER — OFFICE VISIT (OUTPATIENT)
Dept: NON INVASIVE DIAGNOSTICS | Age: 76
End: 2021-04-20
Payer: MEDICARE

## 2021-04-20 VITALS
HEART RATE: 90 BPM | WEIGHT: 210 LBS | SYSTOLIC BLOOD PRESSURE: 144 MMHG | HEIGHT: 62 IN | DIASTOLIC BLOOD PRESSURE: 72 MMHG | RESPIRATION RATE: 18 BRPM | BODY MASS INDEX: 38.64 KG/M2

## 2021-04-20 DIAGNOSIS — I25.5 ISCHEMIC CARDIOMYOPATHY: Primary | ICD-10-CM

## 2021-04-20 DIAGNOSIS — Z45.02 ENCOUNTER FOR ADJUSTMENT AND MANAGEMENT OF AUTOMATIC IMPLANTABLE CARDIAC DEFIBRILLATOR: ICD-10-CM

## 2021-04-20 PROCEDURE — 1036F TOBACCO NON-USER: CPT | Performed by: NURSE PRACTITIONER

## 2021-04-20 PROCEDURE — 99213 OFFICE O/P EST LOW 20 MIN: CPT | Performed by: NURSE PRACTITIONER

## 2021-04-20 PROCEDURE — 1090F PRES/ABSN URINE INCON ASSESS: CPT | Performed by: NURSE PRACTITIONER

## 2021-04-20 PROCEDURE — G8417 CALC BMI ABV UP PARAM F/U: HCPCS | Performed by: NURSE PRACTITIONER

## 2021-04-20 PROCEDURE — 1123F ACP DISCUSS/DSCN MKR DOCD: CPT | Performed by: NURSE PRACTITIONER

## 2021-04-20 PROCEDURE — 4040F PNEUMOC VAC/ADMIN/RCVD: CPT | Performed by: NURSE PRACTITIONER

## 2021-04-20 PROCEDURE — G8400 PT W/DXA NO RESULTS DOC: HCPCS | Performed by: NURSE PRACTITIONER

## 2021-04-20 PROCEDURE — 93283 PRGRMG EVAL IMPLANTABLE DFB: CPT | Performed by: NURSE PRACTITIONER

## 2021-04-20 PROCEDURE — G8427 DOCREV CUR MEDS BY ELIG CLIN: HCPCS | Performed by: NURSE PRACTITIONER

## 2021-04-20 NOTE — PROGRESS NOTES
Cardiac Electrophysiology Inpatient Progress Note    Jorge Hughes  1945    67 y.o.  female  Date of Service: 4/20/2021  Referring Provider/PCP: Anayeli Rodriguez DO  Primary Cardiologist: Dr. Malini Huber  Chief Complaint: management of ICD in-situ    HPI: Jorge Hughes has been seen today for follow up  Ms. Anna Stevenson had a ICD implanted in 2007 for primary prevention for sudden cardiac death and had a generator change in 2014. Since her last office visit she has been complaint with her remotes, the device site is well healed without erosion or migration and she is without complaints of chest pain, orthopnea, PND, feelings of heart racing or palpitation. She does complain of ANNE that she attributes to her oxygen dependent COPD.         Patient Active Problem List    Diagnosis Date Noted    Nonischemic cardiomyopathy ([address] 07/25/2018    Presence of drug coated stent in LAD coronary artery 07/25/2018    ICD (implantable cardioverter-defibrillator) in place 07/25/2018       Past Medical History:   Diagnosis Date    Asthma     Automatic implantable cardioverter defibrillator at end of life     CAD (coronary artery disease)     Cardiomyopathy ([address]     COPD (chronic obstructive pulmonary disease) ([address]     on O2 4 liters continuously    GERD (gastroesophageal reflux disease)     Hyperlipidemia     Hypertension     ICD (implantable cardioverter-defibrillator) in place 7/25/2018    PONV (postoperative nausea and vomiting)     Presence of drug coated stent in LAD coronary artery 7/25/2018    Pulmonary artery hypertension (HCC)     Sleep apnea     on oxygen 4 liters cont    Thyroid disease     VHD (valvular heart disease)        Family History   Problem Relation Age of Onset    Heart Attack Father      There is no family history of sudden cardiac arrest    Social History     Tobacco Use    Smoking status: Former Smoker     Packs/day: 2.50     Years: 6.00     Pack years: 15.00     Types: Cigarettes last 72 hours. Device Interrogation ( 04/20/21 )  Make/Model MDT Evera  Mode AAI<--> DDD 60/120  P wave: 3.6 mV  Impedance: 399 ohms   Threshold: 1 V @ 0.4 ms  RV R wave: 6.3 mV  Impedance: 399 ohms   Threshold: 1 V @ 0.4 ms  Pacing: A: 4.9%  RV: <0.1%    Battery Voltage/Longevity:  2.8 years     Arrhythmias: 3 NSVT 6-14 beats. Reprogramming included counters cleared. Overall device function is normal  All device programmable settings were evaluated per above and in the scanned document, along with iterative adjustments (capture thresholds) to assess and select the most appropriate final programming to provide for consistent delivery of the appropriate therapy and to verify function of the device. Impression:     1. Ischemic cardiomyopathy  - GDMT per cardiology  - Followed with Dr. Makeda Powers    2. ICD in-situ  -  ICD implantation 2007  - Generator change 2014  - [employer] 8244 lead with normal function. Continue to monitor per protocol  - Normal ICD function as above    3. Hypertension  - Well controlled at this visit  - On Cozaar. Aldactone and Toprol XL. 4. Coronary artery disease  - Left heart cath, 4/4/2015, left main 0%. LAD, stents in the proximal and mid segment, patent, minor irregularities. Left circumflex dominant. Mild irregularities. RCA nondominant artery. Proximal RCA stent patent. - Left heart catheterization, 11/1/2017, left main 0%, LAD widely patent stent in the proximal mid and mid to distal segment. Small D1 with a 90% ostial lesion that is jailed. Left circumflex, large and dominant. Appears marginal with an ostial 90% lesion small for PCI, RCA nondominant with patent stents. EF 30-35%  - Followed with     5. Obesity  - Body mass index is 39.04 kg/m². - Encouraged weight loss    6. COPD  - Oxygen dependent  - On nebulizer treatments     7. AMS   - Asymptomatic.   - Less than 0.1% burden. - Defer [facility] at this time. - Continue to monitor.     Recommendations: 1. Continue current medication regimen  2. Remotes every 91 days  3. Follow up in one year with Dr. Dee Pinto. Encouraged the patient to call if she has any questions or concerns. I have spent a total of 25 minutes with the patient and the family reviewing the above stated recommendations. And a total of >50% of that time involved face-to-face time providing counseling and or coordination of care with the other providers. Thank you for allowing me to participate in your patient's care.     PATRICE Barker - CNP  Cardiac Electrophysiology  [facility]) Physicians  [facility] [place]: [facility]  2:33 PM  4/20/2021

## 2021-05-06 ENCOUNTER — NURSE ONLY (OUTPATIENT)
Dept: NON INVASIVE DIAGNOSTICS | Age: 76
End: 2021-05-06
Payer: MEDICARE

## 2021-05-06 DIAGNOSIS — I25.5 ISCHEMIC CARDIOMYOPATHY: ICD-10-CM

## 2021-05-06 DIAGNOSIS — Z95.810 ICD (IMPLANTABLE CARDIOVERTER-DEFIBRILLATOR) IN PLACE: ICD-10-CM

## 2021-05-06 DIAGNOSIS — I50.22 CHRONIC SYSTOLIC CONGESTIVE HEART FAILURE (HCC): Primary | ICD-10-CM

## 2021-06-28 PROCEDURE — 93296 REM INTERROG EVL PM/IDS: CPT | Performed by: INTERNAL MEDICINE

## 2021-06-28 PROCEDURE — 93295 DEV INTERROG REMOTE 1/2/MLT: CPT | Performed by: INTERNAL MEDICINE

## 2021-06-28 NOTE — PROGRESS NOTES
Remote transmission results:    Remote monitoring reviewed over a 90 day period.   End of 90 day monitoring period date of service 5/6/21    Make/Model MDT Prem CAMPA DR  Presenting rhythm: SR, AsVs  Mode MVPR 60/120 ppm  P wave: 2.1 mV  Impedance: 437 ohms   Threshold: 1 V @ 0.4 ms  RV R wave: 3.8 mV  Impedance: 380 ohms   Threshold: 1 V @ 0.4 ms  Pacing: A: 27.1%  RV: <0.1%    Battery Voltage/Longevity:  2.6 years    Arrhythmias:   -1 NSVT 3 beats; 01 sec  -self terminating  -OptiVol: elevated Oct-mid March; baseline    Medications:   -Toprol XL  -Synthroid  -(? Aldactone)    Comment  -ICMP  -Follows with Dr Clayton Pyle  -Remote 8/5/21      PATRICE Argueta-CNP, [address] Electrophysiology  [address] Physicians

## 2021-07-21 ENCOUNTER — TELEPHONE (OUTPATIENT)
Dept: CARDIOLOGY CLINIC | Age: 76
End: 2021-07-21

## 2021-07-21 NOTE — TELEPHONE ENCOUNTER
Pt follows Dr. Abdirizak Barillas for Pulmonology. Per the Dr, her Metoprolol is counter acting with her Brio inhaler and is wanting us to change her heart medication.

## 2021-08-09 ENCOUNTER — TELEPHONE (OUTPATIENT)
Dept: NON INVASIVE DIAGNOSTICS | Age: 76
End: 2021-08-09

## 2021-08-09 NOTE — TELEPHONE ENCOUNTER
Returned patient's call. She was asking if we received the transmission on 8/5/21. Transmission was received. Patient wanted to know what battery status was. I informed her battery longevity is 2.5 years.     Caitlyn Morrison RN, BSN  [facility]

## 2021-10-20 ENCOUNTER — TELEPHONE (OUTPATIENT)
Dept: NON INVASIVE DIAGNOSTICS | Age: 76
End: 2021-10-20

## 2021-11-04 ENCOUNTER — TELEPHONE (OUTPATIENT)
Dept: NON INVASIVE DIAGNOSTICS | Age: 76
End: 2021-11-04

## 2021-11-19 ENCOUNTER — TELEPHONE (OUTPATIENT)
Dept: NON INVASIVE DIAGNOSTICS | Age: 76
End: 2021-11-19

## 2022-01-14 ENCOUNTER — OFFICE VISIT (OUTPATIENT)
Dept: CARDIOLOGY CLINIC | Age: 77
End: 2022-01-14
Payer: MEDICARE

## 2022-01-14 VITALS
HEART RATE: 82 BPM | BODY MASS INDEX: 34.27 KG/M2 | DIASTOLIC BLOOD PRESSURE: 60 MMHG | SYSTOLIC BLOOD PRESSURE: 130 MMHG | HEIGHT: 62 IN | WEIGHT: 186.2 LBS | RESPIRATION RATE: 20 BRPM

## 2022-01-14 DIAGNOSIS — I42.8 NONISCHEMIC CARDIOMYOPATHY (HCC): Primary | ICD-10-CM

## 2022-01-14 PROCEDURE — 99214 OFFICE O/P EST MOD 30 MIN: CPT | Performed by: INTERNAL MEDICINE

## 2022-01-14 PROCEDURE — 4040F PNEUMOC VAC/ADMIN/RCVD: CPT | Performed by: INTERNAL MEDICINE

## 2022-01-14 PROCEDURE — 93000 ELECTROCARDIOGRAM COMPLETE: CPT | Performed by: INTERNAL MEDICINE

## 2022-01-14 PROCEDURE — G8427 DOCREV CUR MEDS BY ELIG CLIN: HCPCS | Performed by: INTERNAL MEDICINE

## 2022-01-14 PROCEDURE — G8484 FLU IMMUNIZE NO ADMIN: HCPCS | Performed by: INTERNAL MEDICINE

## 2022-01-14 PROCEDURE — 1090F PRES/ABSN URINE INCON ASSESS: CPT | Performed by: INTERNAL MEDICINE

## 2022-01-14 PROCEDURE — 1123F ACP DISCUSS/DSCN MKR DOCD: CPT | Performed by: INTERNAL MEDICINE

## 2022-01-14 PROCEDURE — G8400 PT W/DXA NO RESULTS DOC: HCPCS | Performed by: INTERNAL MEDICINE

## 2022-01-14 PROCEDURE — G8417 CALC BMI ABV UP PARAM F/U: HCPCS | Performed by: INTERNAL MEDICINE

## 2022-01-14 PROCEDURE — 1036F TOBACCO NON-USER: CPT | Performed by: INTERNAL MEDICINE

## 2022-01-14 RX ORDER — BUDESONIDE AND FORMOTEROL FUMARATE DIHYDRATE 160; 4.5 UG/1; UG/1
2 AEROSOL RESPIRATORY (INHALATION) 2 TIMES DAILY
COMMUNITY

## 2022-01-14 RX ORDER — FUROSEMIDE 20 MG/1
20 TABLET ORAL DAILY
COMMUNITY

## 2022-01-14 ASSESSMENT — ENCOUNTER SYMPTOMS
ABDOMINAL PAIN: 0
BLOOD IN STOOL: 0
DIARRHEA: 0
BACK PAIN: 0
SHORTNESS OF BREATH: 1
WHEEZING: 0
NAUSEA: 0
CONSTIPATION: 0
COUGH: 1
VOMITING: 0

## 2022-01-14 NOTE — PROGRESS NOTES
OUTPATIENT CARDIOLOGY FOLLOW-UP    HPI:    Name: Megan Floor    Age: 67 y.o. Primary Care Physician: Carlos Mccarthy DO    Date of Service: 1/14/2022    Chief Complaint:   Chief Complaint   Patient presents with    Cardiomyopathy     annual ov     Coronary Artery Disease        History of present illness :   70-year-old female who comes today for 1 year follow-up visit accompanied by her . Juan Kenney has history of ischemic cardiomyopathy, EF 30 to 35% by heart catheterization done in November 2017 status post 6 stents to her LAD, status post AICD and followed up by Dr. Preston Figueroa, PFO, asthma/COPD oxygen dependent, hypertension, hyperlipidemia, hypothyroidism, GERD, chronic kidney disease, anemia and depression. Patient has not been active. She feels pretty good. She is on chronic oxygen therapy. She denies chest discomfort but feels dyspnea on exertion. She denies palpitations, dizziness or syncope. He admits to get left more than right ankle edema. EKG done today revealed sinus rhythm at 82 bpm, probable left atrial enlargement and left bundle branch block. Review of Systems:   Review of Systems   Constitutional: Negative for chills, fatigue and fever. HENT: Negative for nosebleeds. Respiratory: Positive for cough and shortness of breath. Negative for wheezing. Cardiovascular: Positive for leg swelling. Gastrointestinal: Negative for abdominal pain, blood in stool, constipation, diarrhea, nausea and vomiting. Genitourinary: Negative for dysuria and hematuria. Musculoskeletal: Negative for back pain, joint swelling and myalgias. Neurological: Negative for syncope and light-headedness. Psychiatric/Behavioral: The patient is not nervous/anxious.            Past Medical History:  Past Medical History:   Diagnosis Date    Asthma     Automatic implantable cardioverter defibrillator at end of life     CAD (coronary artery disease)     Cardiomyopathy ([facility] [address]     COPD (chronic obstructive pulmonary disease) (HCC)     on O2 4 liters continuously    GERD (gastroesophageal reflux disease)     Hyperlipidemia     Hypertension     ICD (implantable cardioverter-defibrillator) in place 2018    PONV (postoperative nausea and vomiting)     Presence of drug coated stent in LAD coronary artery 2018    Pulmonary artery hypertension (HCC)     Sleep apnea     on oxygen 4 liters cont    Thyroid disease     VHD (valvular heart disease)        Past Surgical History:  Past Surgical History:   Procedure Laterality Date    APPENDECTOMY      BACK SURGERY      X2    CARDIAC DEFIBRILLATOR PLACEMENT      medtronic    CATARACT REMOVAL WITH IMPLANT Bilateral     COLONOSCOPY N/A 3/31/2021    COLONOSCOPY POLYPECTOMY SNARE/COLD BIOPSY performed by Keshawn Moseley MD at [place] [address]  3/31/2021    COLONOSCOPY CONTROL HEMORRHAGE performed by Keshawn Moseley MD at [place] [address] CATH LAB PROCEDURE  2017    6 stents    FINGER TRIGGER RELEASE Right 2019    RIGHT RING A, MASS EXCISION.  RIGHT RING A, JORGE ALBERTO RELEASE ([id number]) performed by Espinoza Koch MD at [address] [place] [address] ECHOCARDIOGRAM  10/05/2017       Family History:  Family History   Problem Relation Age of Onset    Heart Attack Father        Social History:  Social History     Socioeconomic History    Marital status:      Spouse name: Not on file    Number of children: Not on file    Years of education: Not on file    Highest education level: Not on file   Occupational History    Not on file   Tobacco Use    Smoking status: Former Smoker     Packs/day: 2.50     Years: 6.00     Pack years: 15.00     Types: Cigarettes     Quit date: 1970     Years since quittin.0    Smokeless tobacco: Never Used   Vaping Use    Vaping Use: Never used Substance and Sexual Activity    Alcohol use: Yes     Comment: occas    Drug use: No    Sexual activity: Not on file   Other Topics Concern    Not on file   Social History Narrative    Drinks diet pops (clear pops)     Social Determinants of Health     Financial Resource Strain:     Difficulty of Paying Living Expenses: Not on file   Food Insecurity:     Worried About Running Out of Food in the Last Year: Not on file    Andre of Food in the Last Year: Not on file   Transportation Needs:     Lack of Transportation (Medical): Not on file    Lack of Transportation (Non-Medical): Not on file   Physical Activity:     Days of Exercise per Week: Not on file    Minutes of Exercise per Session: Not on file   Stress:     Feeling of Stress : Not on file   Social Connections:     Frequency of Communication with Friends and Family: Not on file    Frequency of Social Gatherings with Friends and Family: Not on file    Attends [employer] Services: Not on file    Active Member of [address] [place] [address] or Organizations: Not on file    Attends Club or Organization Meetings: Not on file    Marital Status: Not on file   Intimate Partner Violence:     Fear of Current or Ex-Partner: Not on file    Emotionally Abused: Not on file    Physically Abused: Not on file    Sexually Abused: Not on file   Housing Stability:     Unable to Pay for Housing in the Last Year: Not on file    Number of [place] in the Last Year: Not on file    Unstable Housing in the Last Year: Not on file       Allergies:   Allergies   Allergen Reactions    Lisinopril Swelling    Other      Environmental dust, cigarette smoke, grass       Current Medications:  Current Outpatient Medications   Medication Sig Dispense Refill    zafirlukast (ACCOLATE) 10 MG tablet Take 10 mg by mouth 2 times daily 2 tablets 2 times daily  Usually takes at noon and HS      fluticasone (FLONASE) 50 MCG/ACT nasal spray 1 spray by Each Nostril route as needed for Rhinitis  albuterol sulfate HFA (PROAIR HFA) 108 (90 Base) MCG/ACT inhaler Inhale 2 puffs into the lungs as needed For wheezing      acetaminophen (TYLENOL) 650 MG extended release tablet Take 650 mg by mouth daily States takes 2 tablets daily      metoprolol succinate (TOPROL XL) 50 MG extended release tablet Take 1.5 tablets by mouth daily 135 tablet 3    Fluticasone furoate-vilanterol (BREO ELLIPTA) 200-25 MCG/INH AEPB inhaler Inhale into the lungs daily Patient's states takes at noon.  Cholecalciferol (VITAMIN D) 50 MCG (2000 UT) CAPS capsule Take by mouth Takes once a week      NONFORMULARY Take by mouth as needed [place] [employer] Lax       TELMISARTAN PO Take 40 mg by mouth daily Takes daily at noon      ferrous sulfate 325 (65 Fe) MG tablet Take 325 mg by mouth three times daily Takes noon, supper and HS      pantoprazole (PROTONIX) 40 MG tablet Take 40 mg by mouth daily Takes daily at noon      levothyroxine (SYNTHROID) 50 MCG tablet Take 75 mcg by mouth Daily Take 75mcg daily      rosuvastatin (CRESTOR) 40 MG tablet Take 40 mg by mouth every evening      aspirin 81 MG tablet Take 81 mg by mouth daily Takes in the evening      albuterol (ACCUNEB) 1.25 MG/3ML nebulizer solution Inhale 1 ampule into the lungs every 6 hours as needed for Wheezing      Multiple Vitamins-Minerals (MULTIVITAMIN ADULT PO) Take by mouth daily      OXYGEN Inhale 2 L/min into the lungs continuous Via n/c      Omega-3 300 MG CAPS Take by mouth daily       No current facility-administered medications for this visit. Physical Exam:  Ht 5' 1.5\" (1.562 m)   BMI 39.04 kg/m²   Wt Readings from Last 3 Encounters:   04/20/21 210 lb (95.3 kg)   03/31/21 207 lb (93.9 kg)   01/06/21 213 lb (96.6 kg)       Physical Exam  Constitutional:       General: She is not in acute distress. Appearance: She is well-developed. She is obese. HENT:      Head: Normocephalic and atraumatic. Neck:      Vascular: No carotid bruit or JVD. Cardiovascular:      Rate and Rhythm: Normal rate and regular rhythm. Heart sounds: No murmur heard. No friction rub. No gallop. Comments: Distant heart sounds. Pulmonary:      Breath sounds: No wheezing or rales. Comments: Bilateral rhonchi noted. Chest:      Chest wall: No tenderness. Abdominal:      General: Bowel sounds are normal. There is no distension. Palpations: Abdomen is soft. There is no mass. Tenderness: There is no abdominal tenderness. Comments: No abdominal bruit. Musculoskeletal:      Cervical back: Neck supple. Right lower leg: Edema present. Left lower leg: Edema present. Comments: Bilateral ankle edema noted. Skin:     General: Skin is warm and dry. Neurological:      Mental Status: She is alert and oriented to person, place, and time. Cardiac Tests:    Cardiac catheterization: Done in November 2017 revealed patent proximal to mid LAD stent, small diagonal branch jailed by the stent with 90% ostial stenosis, nondominant RCA, small OM1 with 90% ostial stenosis, ejection fraction 30 to 35% and LVEDP 10 mmHg      ASSESSMENT / PLAN:  -CAD: Stable with no angina.  However, she is not active.  -Ischemic cardiomyopathy with moderate systolic dysfunction: Clinically compensated compensated except for lower extremity edema.  -Lower extremity edema: Probably multifactorial in origin including pulmonary hypertension due to underlying lung disease, probable tricuspid regurgitation due to AICD, and underlying cardiomyopathy. -AICD: Followed up by Dr. Ennis. -Chronic dyspnea on exertion: Multifactorial in origin including pulmonary hypertension due to severe COPD, systolic and diastolic dysfunction, anemia, obesity and deconditioning  -Hypertension: Controlled. -Hyperlipidemia: On Crestor. -COPD: On oxygen therapy.  -Chronic kidney disease.  -Hypothyroidism. -GERD. -Obesity.  -Depression.     We will continue patient on her current cardiac medications. Consider increasing Lasix to 40 mg daily if worsening lower extremity edema. Patient was advised to have low-salt diet and leg elevation at home. We will arrange for the patient to have an echocardiogram to reassess her left and right ventricular systolic function, RV systolic pressure and rule out significant valvular heart disease. We will follow-up at the office in 1 year. The patient's current medication list, allergies, problem list and results of all previously ordered testing were reviewed at today's visit. {  Jeet Gant MD , [facility].   [facility]) Cardiology

## 2022-02-03 ENCOUNTER — TELEPHONE (OUTPATIENT)
Dept: NON INVASIVE DIAGNOSTICS | Age: 77
End: 2022-02-03

## 2022-02-03 RX ORDER — METOPROLOL SUCCINATE 50 MG/1
50 TABLET, EXTENDED RELEASE ORAL 2 TIMES DAILY
Qty: 180 TABLET | Refills: 3 | Status: SHIPPED | OUTPATIENT
Start: 2022-02-03 | End: 2022-05-04

## 2022-02-03 RX ORDER — METOPROLOL SUCCINATE 50 MG/1
50 TABLET, EXTENDED RELEASE ORAL 2 TIMES DAILY
COMMUNITY
End: 2022-02-03 | Stop reason: SDUPTHER

## 2022-02-03 NOTE — TELEPHONE ENCOUNTER
----- Message from Varun Peres MD sent at 2/3/2022  9:13 AM EST -----  Increase Toprol XL to 50 mg bid.  Thanks.  ----- Message -----  From: Carrillo Yanez RN  Sent: 2/3/2022   9:07 AM EST  To: Varun Peres MD    Non-sustained Ventricular Tachycardia  1  6 NSVT episodes detected since 11/2021 routine report  Longest duration 8s, average V rates 158 - 222 bpm  EGMs are c/w NSVT  Additional Notes:  MEDICATION: TOPROL XL 50 MG 1.5 TABS DAILY AND ASA 81 MG DAILY EF PER HEART CATH 11/2017 30-35%    Created By: Allyssa Snyder 02/03/2022 07:39Edited By: Salome Saxena 02/03/2022 09:07  Tachycardia: AF  1  AT/AF [place]: <1% with 2 mode switches since 11/2021  Longest episode occurred on 20-Dec-2021, duration 6:08 (M:S), mean V rate 127bpm  EGMs are c/w PAF w/ intermittent RV

## 2022-02-03 NOTE — TELEPHONE ENCOUNTER
----- Message from Patricia Lombardo MD sent at 2/3/2022  9:13 AM EST -----  Increase Toprol XL to 50 mg bid.  Thanks.  ----- Message -----  From: Michael Olivares RN  Sent: 2/3/2022   9:07 AM EST  To: Patricia Lombardo MD    Non-sustained Ventricular Tachycardia  1  6 NSVT episodes detected since 11/2021 routine report  Longest duration 8s, average V rates 158 - 222 bpm  EGMs are c/w NSVT  Additional Notes:  MEDICATION: TOPROL XL 50 MG 1.5 TABS DAILY AND ASA 81 MG DAILY EF PER HEART CATH 11/2017 30-35%    Created By: Margoth Yanez 02/03/2022 07:39Edited By: Hemanth Figueroa 02/03/2022 09:07  Tachycardia: AF  1  AT/AF [place]: <1% with 2 mode switches since 11/2021  Longest episode occurred on 20-Dec-2021, duration 6:08 (M:S), mean V rate 127bpm  EGMs are c/w PAF w/ intermittent RV

## 2022-02-03 NOTE — TELEPHONE ENCOUNTER
Notified patient to increase Toprol Xl to 50 mg bid. Melina Monsalve verbalized understanding. Will send prescription to [facility].     Thom Araiza RN, BSN  [facility]

## 2022-02-03 NOTE — TELEPHONE ENCOUNTER
Left message to call office regarding Dr. Nina Carter recommendations.     Bernhard Leyden RN, BSN  [place]

## 2022-07-07 ENCOUNTER — TELEPHONE (OUTPATIENT)
Dept: NON INVASIVE DIAGNOSTICS | Age: 77
End: 2022-07-07

## 2022-07-07 NOTE — TELEPHONE ENCOUNTER
Patient called concerned about the battery life on her ICD. I reassured her when we received her remote transmission on 5/6/22 the estimated longevity was 1.8 years. I told her once her device shows 6-12 months left we do monthly battery checks. We also will have 3 months to change out her device when she triggers MARIA C. She voiced understanding.     Jayda Guallpa RN, BSN  [facility]

## 2022-10-07 ENCOUNTER — TELEPHONE (OUTPATIENT)
Dept: NON INVASIVE DIAGNOSTICS | Age: 77
End: 2022-10-07

## 2022-10-07 NOTE — TELEPHONE ENCOUNTER
Patient called in asking how much battery longevity of her device. I told her the last check in August showed 1.5 years left. She has another device checked scheduled for 11/3/2022.     Paige Kennedy RN, BSN  [facility]

## 2023-02-23 RX ORDER — METOPROLOL SUCCINATE 50 MG/1
TABLET, EXTENDED RELEASE ORAL
Qty: 180 TABLET | Refills: 0 | Status: SHIPPED | OUTPATIENT
Start: 2023-02-23

## 2023-02-23 NOTE — TELEPHONE ENCOUNTER
Patient will need overdue 1 year follow up with Dr Onelia Phillips. Patient info given to scheduling for next available.

## 2023-03-22 ENCOUNTER — OFFICE VISIT (OUTPATIENT)
Dept: CARDIOLOGY CLINIC | Age: 78
End: 2023-03-22
Payer: MEDICARE

## 2023-03-22 VITALS
HEART RATE: 60 BPM | DIASTOLIC BLOOD PRESSURE: 60 MMHG | BODY MASS INDEX: 28.3 KG/M2 | OXYGEN SATURATION: 100 % | WEIGHT: 153.8 LBS | SYSTOLIC BLOOD PRESSURE: 138 MMHG | RESPIRATION RATE: 16 BRPM | HEIGHT: 62 IN

## 2023-03-22 DIAGNOSIS — I25.5 ISCHEMIC CARDIOMYOPATHY: ICD-10-CM

## 2023-03-22 DIAGNOSIS — I25.10 CORONARY ARTERY DISEASE INVOLVING NATIVE CORONARY ARTERY OF NATIVE HEART WITHOUT ANGINA PECTORIS: Primary | ICD-10-CM

## 2023-03-22 PROCEDURE — 99214 OFFICE O/P EST MOD 30 MIN: CPT | Performed by: INTERNAL MEDICINE

## 2023-03-22 PROCEDURE — G8400 PT W/DXA NO RESULTS DOC: HCPCS | Performed by: INTERNAL MEDICINE

## 2023-03-22 PROCEDURE — 1036F TOBACCO NON-USER: CPT | Performed by: INTERNAL MEDICINE

## 2023-03-22 PROCEDURE — G8427 DOCREV CUR MEDS BY ELIG CLIN: HCPCS | Performed by: INTERNAL MEDICINE

## 2023-03-22 PROCEDURE — G8417 CALC BMI ABV UP PARAM F/U: HCPCS | Performed by: INTERNAL MEDICINE

## 2023-03-22 PROCEDURE — 93000 ELECTROCARDIOGRAM COMPLETE: CPT | Performed by: INTERNAL MEDICINE

## 2023-03-22 PROCEDURE — 1090F PRES/ABSN URINE INCON ASSESS: CPT | Performed by: INTERNAL MEDICINE

## 2023-03-22 PROCEDURE — G8484 FLU IMMUNIZE NO ADMIN: HCPCS | Performed by: INTERNAL MEDICINE

## 2023-03-22 PROCEDURE — 1123F ACP DISCUSS/DSCN MKR DOCD: CPT | Performed by: INTERNAL MEDICINE

## 2023-03-22 RX ORDER — LEVOTHYROXINE SODIUM 0.07 MG/1
75 TABLET ORAL DAILY
COMMUNITY
Start: 2023-02-09

## 2023-03-22 RX ORDER — BUDESONIDE AND FORMOTEROL FUMARATE DIHYDRATE 160; 4.5 UG/1; UG/1
2 AEROSOL RESPIRATORY (INHALATION) 2 TIMES DAILY
COMMUNITY

## 2023-03-22 ASSESSMENT — ENCOUNTER SYMPTOMS
BACK PAIN: 0
CONSTIPATION: 0
NAUSEA: 0
COUGH: 0
VOMITING: 0
SHORTNESS OF BREATH: 0
ABDOMINAL PAIN: 0
WHEEZING: 0
DIARRHEA: 0
BLOOD IN STOOL: 0

## 2023-03-22 NOTE — PROGRESS NOTES
since quittin.2    Smokeless tobacco: Never   Vaping Use    Vaping Use: Never used   Substance and Sexual Activity    Alcohol use: Yes     Comment: occas    Drug use: Never    Sexual activity: Not on file   Other Topics Concern    Not on file   Social History Narrative    Drinks 1 diet Dr Micha Raphael daily. Social Determinants of Health     Financial Resource Strain: Not on file   Food Insecurity: Not on file   Transportation Needs: Not on file   Physical Activity: Not on file   Stress: Not on file   Social Connections: Not on file   Intimate Partner Violence: Not on file   Housing Stability: Not on file       Allergies: Allergies   Allergen Reactions    Lisinopril Swelling    Other      Environmental dust, cigarette smoke, grass       Current Medications:  Current Outpatient Medications   Medication Sig Dispense Refill    metoprolol succinate (TOPROL XL) 50 MG extended release tablet TAKE 1 TABLET TWICE A  tablet 0    furosemide (LASIX) 20 MG tablet Take 20 mg by mouth daily      budesonide-formoterol (SYMBICORT) 160-4.5 MCG/ACT AERO Inhale 2 puffs into the lungs 2 times daily      zafirlukast (ACCOLATE) 10 MG tablet Take 10 mg by mouth 2 times daily 2 tablets 2 times daily  Usually takes at noon and HS      fluticasone (FLONASE) 50 MCG/ACT nasal spray 1 spray by Each Nostril route as needed for Rhinitis      albuterol sulfate HFA (PROAIR HFA) 108 (90 Base) MCG/ACT inhaler Inhale 2 puffs into the lungs as needed For wheezing      acetaminophen (TYLENOL) 650 MG extended release tablet Take 650 mg by mouth daily States takes 2 tablets daily      Fluticasone furoate-vilanterol (BREO ELLIPTA) 200-25 MCG/INH AEPB inhaler Inhale into the lungs daily Patient's states takes at noon.  (Patient not taking: Reported on 2022)      Cholecalciferol (VITAMIN D) 50 MCG (2000 UT) CAPS capsule Take by mouth Takes once a week (Patient not taking: Reported on 2022)      NONFORMULARY Take by mouth as needed

## 2023-03-31 ENCOUNTER — TELEPHONE (OUTPATIENT)
Dept: CARDIOLOGY CLINIC | Age: 78
End: 2023-03-31

## 2023-04-05 ENCOUNTER — TELEPHONE (OUTPATIENT)
Dept: CARDIOLOGY | Age: 78
End: 2023-04-05

## 2023-05-08 NOTE — PROGRESS NOTES
distal segment. Small D1 with a 90% ostial lesion that is jailed. Left circumflex, large and dominant. Appears marginal with an ostial 90% lesion small for PCI, RCA nondominant with patent stents. EF 30-35%. - On ASA and Crestor.  - Follows with      5. Obesity  Body mass index is 29.29 kg/m². - Encouraged weight loss     6. COPD  - On Symbicort and Albuterol as needed. 7. AMS   - Asymptomatic.   - Low burden. - Deferred [address] in the past.  - Continue to monitor. 8. Hyperlipidemia  - On Crestor. Recommendations:    1. Normal ICD function. ICD reprogrammed as above. 2. Continue follow up with Cardiology for GDMT adjustment. Consider resume Toprol XL which will defer to Cardiology  3. Await for echocardiogram result  4. Remote interrogations every 31 days to monitor battery life. 5. Office follow up in 6 months or sooner PRN. Encouraged the patient to call the office for any questions or concerns. I have spent a total of 40 minutes with the patient and the family reviewing the above stated recommendations. And a total of >50% of that time involved face-to-face time providing counseling and/or coordination of care with the other providers, preparation for the clinic visit, reviewing records/tests, counseling/education of the patient, ordering medications/tests/procedures, coordinating care, and documenting clinical information in the EHR. Thank you for allowing me to participate in your patient's care. Please call me if there are any questions or concerns.       Julius Mayorga MD  Cardiac Electrophysiology  [facility]) Physicians  [facility] [place]: [facility]  5/9/23   1:12 PM

## 2023-05-09 ENCOUNTER — OFFICE VISIT (OUTPATIENT)
Dept: NON INVASIVE DIAGNOSTICS | Age: 78
End: 2023-05-09
Payer: MEDICARE

## 2023-05-09 VITALS
RESPIRATION RATE: 18 BRPM | HEART RATE: 69 BPM | DIASTOLIC BLOOD PRESSURE: 84 MMHG | WEIGHT: 155 LBS | SYSTOLIC BLOOD PRESSURE: 138 MMHG | BODY MASS INDEX: 29.27 KG/M2 | HEIGHT: 61 IN

## 2023-05-09 DIAGNOSIS — Z95.810 ICD (IMPLANTABLE CARDIOVERTER-DEFIBRILLATOR), DUAL, IN SITU: ICD-10-CM

## 2023-05-09 DIAGNOSIS — R00.2 PALPITATIONS: Primary | ICD-10-CM

## 2023-05-09 PROCEDURE — 93000 ELECTROCARDIOGRAM COMPLETE: CPT | Performed by: INTERNAL MEDICINE

## 2023-05-09 PROCEDURE — G8427 DOCREV CUR MEDS BY ELIG CLIN: HCPCS | Performed by: INTERNAL MEDICINE

## 2023-05-09 PROCEDURE — G8417 CALC BMI ABV UP PARAM F/U: HCPCS | Performed by: INTERNAL MEDICINE

## 2023-05-09 PROCEDURE — 1090F PRES/ABSN URINE INCON ASSESS: CPT | Performed by: INTERNAL MEDICINE

## 2023-05-09 PROCEDURE — 1123F ACP DISCUSS/DSCN MKR DOCD: CPT | Performed by: INTERNAL MEDICINE

## 2023-05-09 PROCEDURE — G8400 PT W/DXA NO RESULTS DOC: HCPCS | Performed by: INTERNAL MEDICINE

## 2023-05-09 PROCEDURE — 1036F TOBACCO NON-USER: CPT | Performed by: INTERNAL MEDICINE

## 2023-05-09 PROCEDURE — 99215 OFFICE O/P EST HI 40 MIN: CPT | Performed by: INTERNAL MEDICINE

## 2023-05-09 PROCEDURE — 93283 PRGRMG EVAL IMPLANTABLE DFB: CPT | Performed by: INTERNAL MEDICINE

## 2023-05-10 ENCOUNTER — HOSPITAL ENCOUNTER (OUTPATIENT)
Dept: CARDIOLOGY | Age: 78
Discharge: HOME | End: 2023-05-10
Payer: MEDICARE

## 2023-05-10 DIAGNOSIS — I25.5 ISCHEMIC CARDIOMYOPATHY: ICD-10-CM

## 2023-05-10 LAB
LV EF: 30 %
LVEF MODALITY: NORMAL

## 2023-05-10 PROCEDURE — 93306 TTE W/DOPPLER COMPLETE: CPT

## 2023-05-11 RX ORDER — METOPROLOL SUCCINATE 50 MG/1
TABLET, EXTENDED RELEASE ORAL
Qty: 180 TABLET | Refills: 3 | Status: SHIPPED | OUTPATIENT
Start: 2023-05-11

## 2023-08-28 PROCEDURE — 93295 DEV INTERROG REMOTE 1/2/MLT: CPT | Performed by: INTERNAL MEDICINE

## 2023-08-28 PROCEDURE — 93296 REM INTERROG EVL PM/IDS: CPT | Performed by: INTERNAL MEDICINE

## 2023-11-27 PROCEDURE — 93296 REM INTERROG EVL PM/IDS: CPT | Performed by: INTERNAL MEDICINE

## 2023-11-27 PROCEDURE — 93295 DEV INTERROG REMOTE 1/2/MLT: CPT | Performed by: INTERNAL MEDICINE

## 2024-03-05 ENCOUNTER — TELEPHONE (OUTPATIENT)
Dept: NON INVASIVE DIAGNOSTICS | Age: 79
End: 2024-03-05

## 2024-03-05 ENCOUNTER — OFFICE VISIT (OUTPATIENT)
Dept: CARDIOLOGY CLINIC | Age: 79
End: 2024-03-05
Payer: MEDICARE

## 2024-03-05 VITALS
WEIGHT: 143.4 LBS | HEART RATE: 65 BPM | OXYGEN SATURATION: 90 % | HEIGHT: 62 IN | SYSTOLIC BLOOD PRESSURE: 134 MMHG | BODY MASS INDEX: 26.39 KG/M2 | DIASTOLIC BLOOD PRESSURE: 60 MMHG

## 2024-03-05 DIAGNOSIS — I25.83 CORONARY ARTERY DISEASE DUE TO LIPID RICH PLAQUE: Primary | ICD-10-CM

## 2024-03-05 DIAGNOSIS — I25.10 CORONARY ARTERY DISEASE DUE TO LIPID RICH PLAQUE: Primary | ICD-10-CM

## 2024-03-05 PROCEDURE — 1090F PRES/ABSN URINE INCON ASSESS: CPT | Performed by: INTERNAL MEDICINE

## 2024-03-05 PROCEDURE — G8400 PT W/DXA NO RESULTS DOC: HCPCS | Performed by: INTERNAL MEDICINE

## 2024-03-05 PROCEDURE — 1123F ACP DISCUSS/DSCN MKR DOCD: CPT | Performed by: INTERNAL MEDICINE

## 2024-03-05 PROCEDURE — 1036F TOBACCO NON-USER: CPT | Performed by: INTERNAL MEDICINE

## 2024-03-05 PROCEDURE — 93000 ELECTROCARDIOGRAM COMPLETE: CPT | Performed by: INTERNAL MEDICINE

## 2024-03-05 PROCEDURE — G8484 FLU IMMUNIZE NO ADMIN: HCPCS | Performed by: INTERNAL MEDICINE

## 2024-03-05 PROCEDURE — G8427 DOCREV CUR MEDS BY ELIG CLIN: HCPCS | Performed by: INTERNAL MEDICINE

## 2024-03-05 PROCEDURE — 99214 OFFICE O/P EST MOD 30 MIN: CPT | Performed by: INTERNAL MEDICINE

## 2024-03-05 PROCEDURE — G8417 CALC BMI ABV UP PARAM F/U: HCPCS | Performed by: INTERNAL MEDICINE

## 2024-03-05 ASSESSMENT — ENCOUNTER SYMPTOMS
VOMITING: 0
DIARRHEA: 0
CONSTIPATION: 0
NAUSEA: 0
ABDOMINAL PAIN: 0
SHORTNESS OF BREATH: 1
COUGH: 0
BACK PAIN: 0
BLOOD IN STOOL: 0
WHEEZING: 0

## 2024-03-05 NOTE — PROGRESS NOTES
OUTPATIENT CARDIOLOGY FOLLOW-UP    HPI:    Name: Josselyn Samson    Age: 78 y.o.    Primary Care Physician: Hari Fofana Jr., DO    Date of Service: 3/5/2024    Chief Complaint:   Chief Complaint   Patient presents with    Coronary Artery Disease     Annual ov.  Is c/o SOBOE.  No other cardiac complaints.     Cardiomyopathy    Shortness of Breath        History of present illness :   78-year-old female who comes today for 1 year follow-up visit accompanied by her .  She has history of ischemic cardiomyopathy, EF 30 to 35% by heart catheterization done in November 2017 status post 6 stents to her LAD, status post AICD and followed up by Dr. Ennis, PFO, asthma/COPD oxygen dependent, hypertension, hyperlipidemia, hypothyroidism, GERD, chronic kidney disease, anemia and depression.     Patient has lost 80 pounds.  She has been off oxygen since March 2023.  She drove to my office today.  She has been fairly active and she is able to clean her house with no chest discomfort.  She feels dyspnea on exertion.  She denies orthopnea, PND or lower extremity edema.  She denies palpitations, dizziness or syncope.    EKG done today reviewed atrial paced rhythm at 65 bpm, left axis deviation, poor R wave progression, and left ventricular hypertrophy with nonspecific ST wave changes.    Review of Systems:   Review of Systems   Constitutional:  Negative for chills, fatigue and fever.   HENT:  Negative for nosebleeds.    Respiratory:  Positive for shortness of breath. Negative for cough and wheezing.    Gastrointestinal:  Negative for abdominal pain, blood in stool, constipation, diarrhea, nausea and vomiting.   Genitourinary:  Negative for dysuria and hematuria.   Musculoskeletal:  Negative for back pain, joint swelling and myalgias.   Neurological:  Negative for syncope and light-headedness.   Psychiatric/Behavioral:  The patient is not nervous/anxious.           Past Medical History:  Past Medical History:

## 2024-03-05 NOTE — TELEPHONE ENCOUNTER
Patient called to see battery life of her ICD. I told her the remote check from 3/3/24 is estimating 2 months. I told her once her device triggers MARIA C we will call. The device will still have about 3 months when it triggers MARIA C. She voiced understanding.    Eryn Shaver RN, BSN  [facility] [place]   Device Clinic

## 2024-03-18 ENCOUNTER — HOSPITAL ENCOUNTER (OUTPATIENT)
Age: 79
Discharge: HOME | End: 2024-03-18
Payer: MEDICARE

## 2024-03-18 ENCOUNTER — HOSPITAL ENCOUNTER (OUTPATIENT)
Dept: OTHER | Age: 79
Setting detail: THERAPIES SERIES
Discharge: HOME | End: 2024-03-18

## 2024-03-18 DIAGNOSIS — I25.83 CORONARY ARTERY DISEASE DUE TO LIPID RICH PLAQUE: ICD-10-CM

## 2024-03-18 DIAGNOSIS — I25.10 CORONARY ARTERY DISEASE DUE TO LIPID RICH PLAQUE: ICD-10-CM

## 2024-03-18 LAB
ANION GAP SERPL CALCULATED.3IONS-SCNC: 13 MMOL/L (ref 7–16)
BNP SERPL-MCNC: 3257 PG/ML (ref 0–450)
BUN SERPL-MCNC: 29 MG/DL (ref 6–23)
CALCIUM SERPL-MCNC: 10 MG/DL (ref 8.6–10.2)
CHLORIDE SERPL-SCNC: 106 MMOL/L (ref 98–107)
CO2 SERPL-SCNC: 26 MMOL/L (ref 22–29)
CREAT SERPL-MCNC: 1.4 MG/DL (ref 0.5–1)
GFR SERPL CREATININE-BSD FRML MDRD: 40 ML/MIN/1.73M2
GLUCOSE SERPL-MCNC: 107 MG/DL (ref 74–99)
POTASSIUM SERPL-SCNC: 4.5 MMOL/L (ref 3.5–5)
SODIUM SERPL-SCNC: 145 MMOL/L (ref 132–146)

## 2024-03-18 PROCEDURE — 83880 ASSAY OF NATRIURETIC PEPTIDE: CPT

## 2024-03-18 PROCEDURE — 36415 COLL VENOUS BLD VENIPUNCTURE: CPT

## 2024-03-18 PROCEDURE — 80048 BASIC METABOLIC PNL TOTAL CA: CPT

## 2024-03-18 NOTE — PROGRESS NOTES
Patient was a no show for today's scheduled appointment. I called and left a VM asking that she call back to reschedule.

## 2024-03-19 ENCOUNTER — TELEPHONE (OUTPATIENT)
Dept: OTHER | Age: 79
End: 2024-03-19

## 2024-03-19 NOTE — TELEPHONE ENCOUNTER
3/19/24 0810- I called and left a voice message to reschedule pt's appt. She left a voice message that she had blood work done but did not realize she also had an appt at the CHF Clinic.    BOOKER Dougherty RN.

## 2024-03-25 ENCOUNTER — HOSPITAL ENCOUNTER (OUTPATIENT)
Dept: OTHER | Age: 79
Setting detail: THERAPIES SERIES
Discharge: HOME | End: 2024-03-25
Payer: MEDICARE

## 2024-03-25 VITALS
RESPIRATION RATE: 18 BRPM | OXYGEN SATURATION: 97 % | WEIGHT: 143 LBS | HEART RATE: 60 BPM | SYSTOLIC BLOOD PRESSURE: 158 MMHG | BODY MASS INDEX: 26.58 KG/M2 | DIASTOLIC BLOOD PRESSURE: 81 MMHG

## 2024-03-25 DIAGNOSIS — I25.5 ISCHEMIC CARDIOMYOPATHY: Primary | ICD-10-CM

## 2024-03-25 LAB
ANION GAP SERPL CALCULATED.3IONS-SCNC: 8 MMOL/L (ref 7–16)
BNP SERPL-MCNC: 2564 PG/ML (ref 0–450)
BUN SERPL-MCNC: 30 MG/DL (ref 6–23)
CALCIUM SERPL-MCNC: 9.8 MG/DL (ref 8.6–10.2)
CHLORIDE SERPL-SCNC: 103 MMOL/L (ref 98–107)
CO2 SERPL-SCNC: 30 MMOL/L (ref 22–29)
CREAT SERPL-MCNC: 1.4 MG/DL (ref 0.5–1)
GFR SERPL CREATININE-BSD FRML MDRD: 37 ML/MIN/1.73M2
GLUCOSE SERPL-MCNC: 87 MG/DL (ref 74–99)
POTASSIUM SERPL-SCNC: 3.9 MMOL/L (ref 3.5–5)
SODIUM SERPL-SCNC: 141 MMOL/L (ref 132–146)

## 2024-03-25 PROCEDURE — 80048 BASIC METABOLIC PNL TOTAL CA: CPT

## 2024-03-25 PROCEDURE — 83880 ASSAY OF NATRIURETIC PEPTIDE: CPT

## 2024-03-25 PROCEDURE — 99205 OFFICE O/P NEW HI 60 MIN: CPT

## 2024-03-25 RX ORDER — SPIRONOLACTONE 25 MG/1
12.5 TABLET ORAL DAILY
Qty: 30 TABLET | Refills: 3 | Status: SHIPPED | OUTPATIENT
Start: 2024-03-25

## 2024-03-25 RX ORDER — BUDESONIDE AND FORMOTEROL FUMARATE DIHYDRATE 80; 4.5 UG/1; UG/1
1 AEROSOL RESPIRATORY (INHALATION)
COMMUNITY

## 2024-03-25 ASSESSMENT — PATIENT HEALTH QUESTIONNAIRE - PHQ9
2. FEELING DOWN, DEPRESSED OR HOPELESS: NOT AT ALL
1. LITTLE INTEREST OR PLEASURE IN DOING THINGS: NOT AT ALL
SUM OF ALL RESPONSES TO PHQ9 QUESTIONS 1 & 2: 0

## 2024-03-25 NOTE — FLOWSHEET NOTE
03/25/24 1301   Over the past 2 weeks, how often have you been bothered by any of the following problems?   Little interest or pleasure in doing things Not at all   Feeling down, depressed, or hopeless Not at all   Patient Health Questionnaire-2 Score 0

## 2024-03-25 NOTE — RESULT ENCOUNTER NOTE
CHF clinic visit and labs reviewed. First visit here   /81  Pulse 60    BNP 3257>>2564  Renal function stable: Cr 1.4     Can we confirm what her Lisinopril allergy was? Listed in the computer as \"swelling\"     Add Aldactone 12.5 mg daily (sent to her pharmacy)  Repeat BMP in one week  Return to clinic in two weeks for medication titration     If we verify that she has true ACE-I allergy, and therefore are unable to use Entresto, will plan to stop telmisartan and change to losartan or valsartan given her heart failure.     Also anticipate addition of SGLT2

## 2024-03-25 NOTE — PROGRESS NOTES
Congestive Heart Failure Clinic   [facility]      Referring Provider: Dr. Velez  Primary Care Physician: Hari Fofana Jr., DO   Cardiologist: Dr. Velez  Nephrologist: -      HISTORY OF PRESENT ILLNESS:     Josselyn Samson is a 78 y.o. (1945) female with a history of HFrEF (EF < 40%)  Pre Cupid:     Lab Results   Component Value Date    LVEF 30 05/10/2023     Post Cupid:  No results found for: \"EFBP\"      She presents to the CHF clinic for ongoing evaluation and monitoring of heart failure.    In the CHF clinic today she denies any adverse symptoms except:  [] Dizziness or lightheadedness   [] Syncope or near syncope  [] Recent Fall  [] Shortness of breath at rest   [] Dyspnea with exertion  [] Decline in functional capacity (unable to perform activities they had previously been able to do)  [] Fatigue   [] Orthopnea  [] PND  [] Nocturnal cough  [] Hemoptysis  [] Chest pain, pressure, or discomfort  [] Palpitations  [] Abdominal distention  [] Abdominal bloating  [] Early satiety  [] Blood in stool   [] Diarrhea  [] Constipation  [] Nausea/Vomiting  [] Decreased urinary response to oral diuretic   [] Scrotal swelling   [] Lower extremity edema  [] Used PRN doses of oral diuretic   [] Weight gain    Wt Readings from Last 3 Encounters:   03/25/24 64.9 kg (143 lb)   03/05/24 65 kg (143 lb 6.4 oz)   05/09/23 70.3 kg (155 lb)           SOCIAL HISTORY:  [x] Denies tobacco, alcohol or illicit drug abuse  [] Tobacco use:  [] ETOH use:  [] Illicit drug use:        MEDICATIONS:    Allergies   Allergen Reactions    Lisinopril Swelling    Other      Environmental dust, cigarette smoke, grass     Prior to Visit Medications    Medication Sig Taking? Authorizing Provider   budesonide-formoterol (SYMBICORT) 80-4.5 MCG/ACT AERO Inhale 1 puff into the lungs daily Yes Provider, MD Kodi   metoprolol succinate (TOPROL XL) 50 MG extended release tablet TAKE 1

## 2024-03-26 ENCOUNTER — TELEPHONE (OUTPATIENT)
Dept: OTHER | Age: 79
End: 2024-03-26

## 2024-03-26 NOTE — TELEPHONE ENCOUNTER
Called patient to discuss medication changes.    Padmini Zendejas, PATRICE - Annabelle Quiñones RN  CHF clinic visit and labs reviewed. First visit here  /81  Pulse 60    BNP 3257>>2564  Renal function stable: Cr 1.4    Can we confirm what her Lisinopril allergy was? Listed in the computer as \"swelling\"    Add Aldactone 12.5 mg daily (sent to her pharmacy)  Repeat BMP in one week  Return to clinic in two weeks for medication titration    If we verify that she has true ACE-I allergy, and therefore are unable to use Entresto, will plan to stop telmisartan and change to losartan or valsartan given her heart failure.     Patient states that in the past when she took lisinopril she had swelling in her face and had trouble breathing. She expressed verbal understanding of orders and repeated them back correctly. Encouraged to call the clinic back with any questions.

## 2024-03-26 NOTE — TELEPHONE ENCOUNTER
----- Message from PATRICE Galo - CNS sent at 3/25/2024  2:51 PM EDT -----  CHF clinic visit and labs reviewed. First visit here   /81  Pulse 60    BNP 3257>>2564  Renal function stable: Cr 1.4     Can we confirm what her Lisinopril allergy was? Listed in the computer as \"swelling\"     Add Aldactone 12.5 mg daily (sent to her pharmacy)  Repeat BMP in one week  Return to clinic in two weeks for medication titration     If we verify that she has true ACE-I allergy, and therefore are unable to use Entresto, will plan to stop telmisartan and change to losartan or valsartan given her heart failure.     Also anticipate addition of SGLT2

## 2024-03-27 ENCOUNTER — OFFICE VISIT (OUTPATIENT)
Dept: SURGERY | Age: 79
End: 2024-03-27

## 2024-03-27 VITALS
SYSTOLIC BLOOD PRESSURE: 128 MMHG | TEMPERATURE: 97.3 F | RESPIRATION RATE: 20 BRPM | OXYGEN SATURATION: 92 % | DIASTOLIC BLOOD PRESSURE: 82 MMHG | HEIGHT: 62 IN | BODY MASS INDEX: 26.41 KG/M2 | HEART RATE: 64 BPM | WEIGHT: 143.5 LBS

## 2024-03-27 DIAGNOSIS — L98.9 SKIN LESION: Primary | ICD-10-CM

## 2024-03-27 RX ORDER — LIDOCAINE HYDROCHLORIDE AND EPINEPHRINE 10; 10 MG/ML; UG/ML
1 INJECTION, SOLUTION INFILTRATION; PERINEURAL ONCE
Status: COMPLETED | OUTPATIENT
Start: 2024-03-27 | End: 2024-03-27

## 2024-03-27 RX ADMIN — LIDOCAINE HYDROCHLORIDE AND EPINEPHRINE 1 ML: 10; 10 INJECTION, SOLUTION INFILTRATION; PERINEURAL at 11:25

## 2024-03-27 NOTE — PROGRESS NOTES
Diagnosis  -) Suspicious lesions of the left mid face and right lower leg    -We will plan to proceed and have the Suspicious lesions of the left mid face and right lower leg biopsied to rule out malignancy.    -The risks, benefits and options were discussed with the pt. The risks included but not limited to pain, bleeding, infection, heavy scarring, damage to surrounding structures, fluid collections, asymmetry, and need for further procedures. All of Her questions were answered to their satisfaction and She agrees to proceed with the procedure.    -After consent was obtained, the area was cleansed with an alcohol swab. Local anesthesia consisting of 1% lidocaine with 1:100,000 epinepherine was injected  surrounding the area. The local was allowed to work.  Using a 10-blade the lesion was shaved, hemostasis was obtained and a bandage was placed.  The procedure was performed by me.    I have discussed with the patient that they should make every attempt to follow-up within this time interval in the event that the pathology or radiographic findings require further attention such as surgical or other medical intervention.  They voiced complete understanding.    The patient was educated to keep the bandage in place and apply bacitracin to the wound site BID.  OK to shower at this time.  Advised the patient that they can allow soap and water to rinse of the wound site while showering.  Once they are done in the shower they are to pat dry the incision site with a clean paper towel.  No baths, hot tubs or soaking of the wound site at this time.  Pt voices understanding.     Photos obtained.  Chaperone present for entire visit.    FU- 7-14 days      This document is generated, in part, by voice recognition software and thus  syntax and grammatical errors are possible.    LISE Simon  8:56 AM  3/27/2024

## 2024-03-29 NOTE — PROGRESS NOTES
Josselyn Samson is a 78 y.o. female evaluated via telephone on 4/8/2024.      Consent:  She and/or health care decision maker is aware that that she may receive a bill for this telephone service, depending on her insurance coverage, and has provided verbal consent to proceed: Yes    The patient was in the state of [place] during the entirety of the phone conversation.      Documentation:  I communicated with the patient and/or health care decision maker about the pathology results from their most recent biopsy.   Details of this discussion including any medical advice provided:     Pathology -AK    Path Number: [id number]     -- Diagnosis --   A.  Skin of left face, tangential excision:   Actinic keratosis.   Prominent solar elastosis.   B.  Skin of right lower leg, tangential excision:   Histologic changes consistent with stasis dermatitis.   Localized foci of benign parakeratosis (see comment).       Comment: Occasional korina of parakeratosis are appreciated within the   right lower leg epidermis (part B).  The foci are unassociated with   keratinocyte atypia.  Clinical correlation is essential in excluding   the possibility of lower extremity involvement by disseminated   superficial actinic porokeratosis (DSAP).       Tawanda Lara MD   **Electronically Signed Out**         rcb/4/1/2024      Clinical Information   Shave biopsy, left face/right lower leg.     Pre-Operative Diagnosis   Skin lesion.       Source of Specimen   A: SKIN LESION, LEFT FACE   B: SKIN LESION, RIGHT LOWER LEG         I explained to the patient their pathology results which is actinic keratosis.  I advised patient that this is a precancerous lesion and will need to be treated.  I discussed with him today erbium YAG laser destruction of this lesion in office under local anesthesia.  I informed the patient that this would be performed by Dr. Flaquito Guajardo in the office.  We will plan to prior authorize this through their insurance and call them

## 2024-04-01 ENCOUNTER — SCHEDULED TELEPHONE ENCOUNTER (OUTPATIENT)
Dept: SURGERY | Age: 79
End: 2024-04-01

## 2024-04-01 DIAGNOSIS — L57.0 AK (ACTINIC KERATOSIS): Primary | ICD-10-CM

## 2024-04-01 LAB — SURGICAL PATHOLOGY REPORT: NORMAL

## 2024-04-09 ENCOUNTER — HOSPITAL ENCOUNTER (OUTPATIENT)
Dept: OTHER | Age: 79
Setting detail: THERAPIES SERIES
Discharge: HOME | End: 2024-04-09
Payer: MEDICARE

## 2024-04-09 VITALS
DIASTOLIC BLOOD PRESSURE: 62 MMHG | WEIGHT: 144 LBS | RESPIRATION RATE: 18 BRPM | BODY MASS INDEX: 26.77 KG/M2 | OXYGEN SATURATION: 95 % | SYSTOLIC BLOOD PRESSURE: 123 MMHG | HEART RATE: 72 BPM

## 2024-04-09 LAB
ANION GAP SERPL CALCULATED.3IONS-SCNC: 9 MMOL/L (ref 7–16)
BNP SERPL-MCNC: 1724 PG/ML (ref 0–450)
BUN SERPL-MCNC: 34 MG/DL (ref 6–23)
CALCIUM SERPL-MCNC: 9.7 MG/DL (ref 8.6–10.2)
CHLORIDE SERPL-SCNC: 102 MMOL/L (ref 98–107)
CO2 SERPL-SCNC: 29 MMOL/L (ref 22–29)
CREAT SERPL-MCNC: 1.7 MG/DL (ref 0.5–1)
GFR SERPL CREATININE-BSD FRML MDRD: 31 ML/MIN/1.73M2
GLUCOSE SERPL-MCNC: 95 MG/DL (ref 74–99)
POTASSIUM SERPL-SCNC: 4.5 MMOL/L (ref 3.5–5)
SODIUM SERPL-SCNC: 140 MMOL/L (ref 132–146)

## 2024-04-09 PROCEDURE — 83880 ASSAY OF NATRIURETIC PEPTIDE: CPT

## 2024-04-09 PROCEDURE — 99214 OFFICE O/P EST MOD 30 MIN: CPT

## 2024-04-09 PROCEDURE — 80048 BASIC METABOLIC PNL TOTAL CA: CPT

## 2024-04-09 NOTE — PROGRESS NOTES
4/9/24 1435- I called and spoke with patient,encouraged pt to drink 64 ounces/day.  She verbalized understanding.  Labs placed under Olivia Prescott, await if any new orders.    BOOKER Dougherty RN.     
/ cardiac rehab   [] Life[employer]est use  [x] Patient aware of signs and symptoms of worsening HF, CHF clinic phone number provided and made aware to call clinic for sooner if evaluation if needed     [] Difficulty affording medications  [] CHF CHW consulted  [] Prescription assistance information given   [] [facility] medication assistance program information given   [] Sample medications provided to patient to help bridge gap until affordability N/A    [x] PHQ assessment completed while in CHF clinic (1st visit, every 3 months and PRN)       No data to display              Interpretation of Total Score Depression Severity:   1-4 = Minimal depression  5-9 = Mild depression  10-14 = Moderate depression  15-19 = Moderately severe depression  20-27 = Severe depression   [] Patient provided community resources for counseling services  [] PCP called and PHQ result faxed   [] Behavorial health consultant called        Scheduled to follow up in CHF clinic on:   Future Appointments   Date Time Provider Department Center   4/19/2024 11:00 AM Milagro Agee, APRN - CNP ELECTRO [facility]YS [facility]   4/19/2024 11:00 AM SCHEDULE, DEVICE CLINIC 1 [facility]LECTRO [facility]   4/25/2024 10:00 AM [facility] CHF ROOM 2 [facility]   5/10/2024 11:30 AM Flaquito Guajardo MD Plastics [facility]

## 2024-04-14 DIAGNOSIS — I50.32 CHRONIC HEART FAILURE WITH PRESERVED EJECTION FRACTION (HCC): Primary | ICD-10-CM

## 2024-04-15 ENCOUNTER — APPOINTMENT (OUTPATIENT)
Dept: OTHER | Age: 79
End: 2024-04-15
Payer: MEDICARE

## 2024-04-17 ENCOUNTER — TELEPHONE (OUTPATIENT)
Dept: OTHER | Age: 79
End: 2024-04-17

## 2024-04-17 NOTE — TELEPHONE ENCOUNTER
4/17/24 0810- I called and spoke with Josselyn regarding labs and 1 week, elevated creatinine level and stay hydrated, she verbalized understanding.    BOOKER Dougherty RN.

## 2024-04-19 ENCOUNTER — OFFICE VISIT (OUTPATIENT)
Dept: NON INVASIVE DIAGNOSTICS | Age: 79
End: 2024-04-19
Payer: MEDICARE

## 2024-04-19 VITALS
HEIGHT: 62 IN | DIASTOLIC BLOOD PRESSURE: 72 MMHG | HEART RATE: 63 BPM | WEIGHT: 142 LBS | BODY MASS INDEX: 26.13 KG/M2 | SYSTOLIC BLOOD PRESSURE: 116 MMHG

## 2024-04-19 DIAGNOSIS — R00.2 PALPITATIONS: Primary | ICD-10-CM

## 2024-04-19 DIAGNOSIS — Z95.810 ICD (IMPLANTABLE CARDIOVERTER-DEFIBRILLATOR), DUAL, IN SITU: ICD-10-CM

## 2024-04-19 PROCEDURE — G8400 PT W/DXA NO RESULTS DOC: HCPCS | Performed by: NURSE PRACTITIONER

## 2024-04-19 PROCEDURE — 1123F ACP DISCUSS/DSCN MKR DOCD: CPT | Performed by: NURSE PRACTITIONER

## 2024-04-19 PROCEDURE — 99214 OFFICE O/P EST MOD 30 MIN: CPT | Performed by: NURSE PRACTITIONER

## 2024-04-19 PROCEDURE — 1090F PRES/ABSN URINE INCON ASSESS: CPT | Performed by: NURSE PRACTITIONER

## 2024-04-19 PROCEDURE — 1036F TOBACCO NON-USER: CPT | Performed by: NURSE PRACTITIONER

## 2024-04-19 PROCEDURE — G8417 CALC BMI ABV UP PARAM F/U: HCPCS | Performed by: NURSE PRACTITIONER

## 2024-04-19 PROCEDURE — G8427 DOCREV CUR MEDS BY ELIG CLIN: HCPCS | Performed by: NURSE PRACTITIONER

## 2024-04-25 ENCOUNTER — HOSPITAL ENCOUNTER (OUTPATIENT)
Dept: OTHER | Age: 79
Setting detail: THERAPIES SERIES
Discharge: HOME | End: 2024-04-25
Payer: MEDICARE

## 2024-04-25 VITALS
OXYGEN SATURATION: 96 % | DIASTOLIC BLOOD PRESSURE: 60 MMHG | BODY MASS INDEX: 26.58 KG/M2 | RESPIRATION RATE: 18 BRPM | SYSTOLIC BLOOD PRESSURE: 164 MMHG | HEART RATE: 64 BPM | WEIGHT: 143 LBS

## 2024-04-25 LAB
ANION GAP SERPL CALCULATED.3IONS-SCNC: 11 MMOL/L (ref 7–16)
BNP SERPL-MCNC: 1685 PG/ML (ref 0–450)
BUN SERPL-MCNC: 25 MG/DL (ref 6–23)
CALCIUM SERPL-MCNC: 9.6 MG/DL (ref 8.6–10.2)
CHLORIDE SERPL-SCNC: 104 MMOL/L (ref 98–107)
CO2 SERPL-SCNC: 26 MMOL/L (ref 22–29)
CREAT SERPL-MCNC: 1.8 MG/DL (ref 0.5–1)
GFR SERPL CREATININE-BSD FRML MDRD: 28 ML/MIN/1.73M2
GLUCOSE SERPL-MCNC: 94 MG/DL (ref 74–99)
POTASSIUM SERPL-SCNC: 4.3 MMOL/L (ref 3.5–5)
SODIUM SERPL-SCNC: 141 MMOL/L (ref 132–146)

## 2024-04-25 PROCEDURE — 83880 ASSAY OF NATRIURETIC PEPTIDE: CPT

## 2024-04-25 PROCEDURE — 80048 BASIC METABOLIC PNL TOTAL CA: CPT

## 2024-04-25 PROCEDURE — 99214 OFFICE O/P EST MOD 30 MIN: CPT

## 2024-04-25 NOTE — PROGRESS NOTES
4/25/24 1135- I routed note to Dr. Velez and Terrie, pt's bp during visit today.    Josselyn was seen in the CHF Clinic today.  BP- 178/60>>170/58>>164/60. She states she took her medications this morning.      BOOKER Dougherty RN.

## 2024-04-25 NOTE — PROGRESS NOTES
Congestive Heart Failure Clinic   [facility]      Referring Provider: Dr. Velez  Primary Care Physician: Hari Fofana Jr., DO   Cardiologist: Dr. Velez  Nephrologist: -      HISTORY OF PRESENT ILLNESS:     Josselyn Samson is a 78 y.o. (1945) female with a history of HFrEF (EF < 40%)  Pre Cupid:     Lab Results   Component Value Date    LVEF 30 05/10/2023     Post Cupid:  No results found for: \"EFBP\"      She presents to the CHF clinic for ongoing evaluation and monitoring of heart failure.    In the CHF clinic today she denies any adverse symptoms except:  [] Dizziness or lightheadedness   [] Syncope or near syncope  [] Recent Fall  [] Shortness of breath at rest   [x] Dyspnea with exertion (long distances)- she states d/t asthma  [] Decline in functional capacity (unable to perform activities they had previously been able to do)  [] Fatigue   [] Orthopnea  [] PND  [] Nocturnal cough  [] Hemoptysis  [] Chest pain, pressure, or discomfort  [] Palpitations  [] Abdominal distention  [] Abdominal bloating  [] Early satiety  [] Blood in stool   [] Diarrhea  [] Constipation  [] Nausea/Vomiting  [] Decreased urinary response to oral diuretic   [] Scrotal swelling   [] Lower extremity edema  [] Used PRN doses of oral diuretic   [] Weight gain      Wt Readings from Last 3 Encounters:   04/25/24 64.9 kg (143 lb)   04/19/24 64.4 kg (142 lb)   04/09/24 65.3 kg (144 lb)           SOCIAL HISTORY:  [x] Denies tobacco, alcohol or illicit drug abuse  [] Tobacco use:  [] ETOH use:  [] Illicit drug use:        MEDICATIONS:    Allergies   Allergen Reactions    Lisinopril Swelling     Patient states she had swelling in her face and had trouble breathing.     Other      Environmental dust, cigarette smoke, grass     Prior to Visit Medications    Medication Sig Taking? Authorizing Provider   budesonide-formoterol (SYMBICORT) 80-4.5 MCG/ACT AERO Inhale 1 puff into the

## 2024-05-06 RX ORDER — METOPROLOL SUCCINATE 50 MG/1
50 TABLET, EXTENDED RELEASE ORAL 2 TIMES DAILY
Qty: 180 TABLET | Refills: 3 | Status: SHIPPED | OUTPATIENT
Start: 2024-05-06

## 2024-05-10 ENCOUNTER — PROCEDURE VISIT (OUTPATIENT)
Dept: SURGERY | Age: 79
End: 2024-05-10
Payer: MEDICARE

## 2024-05-10 DIAGNOSIS — L57.0 AK (ACTINIC KERATOSIS): Primary | ICD-10-CM

## 2024-05-10 PROCEDURE — 17000 DESTRUCT PREMALG LESION: CPT | Performed by: PLASTIC SURGERY

## 2024-05-14 RX ORDER — LIDOCAINE HYDROCHLORIDE AND EPINEPHRINE 10; 10 MG/ML; UG/ML
1 INJECTION, SOLUTION INFILTRATION; PERINEURAL ONCE
Status: SHIPPED | OUTPATIENT
Start: 2024-05-14

## 2024-05-20 ENCOUNTER — OFFICE VISIT (OUTPATIENT)
Dept: SURGERY | Age: 79
End: 2024-05-20

## 2024-05-20 VITALS — TEMPERATURE: 97.3 F

## 2024-05-20 DIAGNOSIS — L57.0 AK (ACTINIC KERATOSIS): Primary | ICD-10-CM

## 2024-05-20 NOTE — PROGRESS NOTES
Subjective:      Follow up today from Er YAG laser ablation to left face Actinic Keratosis. Denies fever, nausea, vomiting, leg pain or swelling.  The patient denies any problems after the ablation and has been keeping bacitracin to the wound site.    Objective:    Temp 97.3 °F (36.3 °C) (Infrared)       Wound: Clean, dry, and intact, no drainage. Wound maturing well      Assessment:    Patient Active Problem List   Diagnosis    Nonischemic cardiomyopathy (HCC)    Presence of drug coated stent in LAD coronary artery    ICD (implantable cardioverter-defibrillator) in place       Plan:     Diagnosis  -) left face Actinic Keratosis    Discontinue bacitracin to the wound site  Pt was instructed on scar massage  Educated the patient that she will need to call our office with any signs of lesion recurrence. Pt voices understanding  Scar Care Instructions      Sunscreen Recommendations for Scars  We recommend that all patients use a sunscreen when outside but especially on new scars (that are exposed to sunlight) for a year after their procedure.   The SPF should be at least 35. Follow the application directions on the bottle.   Why is it so Important to Use Sunscreen on Scars?  A scar is new and more fragile than the surrounding skin.   If you do not use sunscreen, the scar line will react differently to the sun than the surrounding skin.   If you don't use sunscreen, the scar tissue will become darker than surrounding skin. This is a hyper-pigmented scar and will remain darker than the other skin.   After one year, the scar and surrounding skin should react equally to sun.   Superficial Scar Massage  Scar massage desensitizes and reduces scar adhesions so skin glides freely.   Rub in a circular motion on and around the scar with firm, even pressure for 5 minutes four times per day   You can start scar massage once incision is completely healed and strong enough to handle the motion (usually 10 - 14 days post

## 2024-05-29 ENCOUNTER — HOSPITAL ENCOUNTER (OUTPATIENT)
Dept: OTHER | Age: 79
Setting detail: THERAPIES SERIES
Discharge: HOME | End: 2024-05-29
Payer: MEDICARE

## 2024-05-29 ENCOUNTER — TELEPHONE (OUTPATIENT)
Dept: OTHER | Age: 79
End: 2024-05-29

## 2024-05-29 VITALS
SYSTOLIC BLOOD PRESSURE: 149 MMHG | WEIGHT: 146.3 LBS | OXYGEN SATURATION: 94 % | DIASTOLIC BLOOD PRESSURE: 66 MMHG | RESPIRATION RATE: 18 BRPM | BODY MASS INDEX: 27.2 KG/M2 | HEART RATE: 63 BPM

## 2024-05-29 LAB
ANION GAP SERPL CALCULATED.3IONS-SCNC: 13 MMOL/L (ref 7–16)
BNP SERPL-MCNC: 2743 PG/ML (ref 0–450)
BUN SERPL-MCNC: 30 MG/DL (ref 6–23)
CALCIUM SERPL-MCNC: 9.3 MG/DL (ref 8.6–10.2)
CHLORIDE SERPL-SCNC: 104 MMOL/L (ref 98–107)
CO2 SERPL-SCNC: 25 MMOL/L (ref 22–29)
CREAT SERPL-MCNC: 1.8 MG/DL (ref 0.5–1)
GFR, ESTIMATED: 29 ML/MIN/1.73M2
GLUCOSE SERPL-MCNC: 96 MG/DL (ref 74–99)
POTASSIUM SERPL-SCNC: 3.9 MMOL/L (ref 3.5–5)
SODIUM SERPL-SCNC: 142 MMOL/L (ref 132–146)

## 2024-05-29 PROCEDURE — 80048 BASIC METABOLIC PNL TOTAL CA: CPT

## 2024-05-29 PROCEDURE — 83880 ASSAY OF NATRIURETIC PEPTIDE: CPT

## 2024-05-29 PROCEDURE — 99214 OFFICE O/P EST MOD 30 MIN: CPT

## 2024-05-29 RX ORDER — ISOSORBIDE DINITRATE 10 MG/1
5 TABLET ORAL 3 TIMES DAILY
Qty: 90 TABLET | Refills: 3 | Status: SHIPPED | OUTPATIENT
Start: 2024-05-29

## 2024-05-29 RX ORDER — HYDRALAZINE HYDROCHLORIDE 10 MG/1
10 TABLET, FILM COATED ORAL 3 TIMES DAILY
Qty: 90 TABLET | Refills: 3 | Status: SHIPPED | OUTPATIENT
Start: 2024-05-29 | End: 2024-09-26

## 2024-05-29 NOTE — RESULT ENCOUNTER NOTE
CHF clinic visit and labs reviewed   VS:   BP: 149/66   Pulse: 63  Weight up 3 lbs, admits to dietary indiscretion. Declined IV diuretic     BNP 1685>>2743  BUN 25>>30  Cr 1.8>>1.8    GDMT:   Toprol XL 50 mg twice daily   Aldactone 12.5 mg daily   Lasix 20 mg daily   Lisinopril allergy (facial swelling)    Increase Lasix to 20 mg twice daily for three days, then return to 20 mg daily   Add Hydralazine 10 mg three times daily   Isordil 5 mg three times daily     Follow up as scheduled

## 2024-05-29 NOTE — TELEPHONE ENCOUNTER
----- Message from PATRICE Galo - CNS sent at 5/29/2024 11:38 AM EDT -----  CHF clinic visit and labs reviewed   VS:   BP: 149/66   Pulse: 63  Weight up 3 lbs, admits to dietary indiscretion. Declined IV diuretic     BNP 1685>>2743  BUN 25>>30  Cr 1.8>>1.8    GDMT:   Toprol XL 50 mg twice daily   Aldactone 12.5 mg daily   Lasix 20 mg daily   Lisinopril allergy (facial swelling)    Increase Lasix to 20 mg twice daily for three days, then return to 20 mg daily   Add Hydralazine 10 mg three times daily   Isordil 5 mg three times daily     Follow up as scheduled

## 2024-05-29 NOTE — PROGRESS NOTES
Congestive Heart Failure Clinic   [facility]      Referring Provider: Dr. Velez  Primary Care Physician: Hari Fofana Jr., DO   Cardiologist: Dr. Velez  Nephrologist: -      HISTORY OF PRESENT ILLNESS:     Josselyn Samson is a 78 y.o. (1945) female with a history of HFrEF (EF < 40%)  Pre Cupid:     Lab Results   Component Value Date    LVEF 30 05/10/2023         She presents to the CHF clinic for ongoing evaluation and monitoring of heart failure.    In the CHF clinic today she denies any adverse symptoms except:  [] Dizziness or lightheadedness   [] Syncope or near syncope  [] Recent Fall  [] Shortness of breath at rest   [x] Dyspnea with exertion (long distances)- she states d/t asthma  [] Decline in functional capacity (unable to perform activities they had previously been able to do)  [] Fatigue   [] Orthopnea  [] PND  [] Nocturnal cough  [] Hemoptysis  [] Chest pain, pressure, or discomfort  [] Palpitations  [] Abdominal distention  [] Abdominal bloating  [] Early satiety  [] Blood in stool   [] Diarrhea  [] Constipation  [] Nausea/Vomiting  [] Decreased urinary response to oral diuretic   [] Scrotal swelling   [x] Lower extremity edema  [] Used PRN doses of oral diuretic   [x] Weight gain      Wt Readings from Last 3 Encounters:   05/29/24 66.4 kg (146 lb 4.8 oz)   04/25/24 64.9 kg (143 lb)   04/19/24 64.4 kg (142 lb)           SOCIAL HISTORY:  [x] Denies tobacco, alcohol or illicit drug abuse  [] Tobacco use:  [] ETOH use:  [] Illicit drug use:        MEDICATIONS:    Allergies   Allergen Reactions    Lisinopril Swelling     Patient states she had swelling in her face and had trouble breathing.     Other      Environmental dust, cigarette smoke, grass     Prior to Visit Medications    Medication Sig Taking? Authorizing Provider   hydrALAZINE (APRESOLINE) 10 MG tablet Take 1 tablet by mouth 3 times daily  Padmini Zendejas, APRN - CNS

## 2024-06-13 ENCOUNTER — HOSPITAL ENCOUNTER (OUTPATIENT)
Dept: OTHER | Age: 79
Setting detail: THERAPIES SERIES
Discharge: HOME | End: 2024-06-13
Payer: MEDICARE

## 2024-06-13 VITALS
OXYGEN SATURATION: 93 % | WEIGHT: 142 LBS | RESPIRATION RATE: 18 BRPM | DIASTOLIC BLOOD PRESSURE: 60 MMHG | HEART RATE: 65 BPM | SYSTOLIC BLOOD PRESSURE: 120 MMHG | BODY MASS INDEX: 26.4 KG/M2

## 2024-06-13 LAB
ANION GAP SERPL CALCULATED.3IONS-SCNC: 10 MMOL/L (ref 7–16)
BNP SERPL-MCNC: 5598 PG/ML (ref 0–450)
BUN SERPL-MCNC: 37 MG/DL (ref 6–23)
CALCIUM SERPL-MCNC: 9.6 MG/DL (ref 8.6–10.2)
CHLORIDE SERPL-SCNC: 102 MMOL/L (ref 98–107)
CO2 SERPL-SCNC: 27 MMOL/L (ref 22–29)
CREAT SERPL-MCNC: 1.9 MG/DL (ref 0.5–1)
GFR, ESTIMATED: 27 ML/MIN/1.73M2
GLUCOSE SERPL-MCNC: 112 MG/DL (ref 74–99)
POTASSIUM SERPL-SCNC: 4.3 MMOL/L (ref 3.5–5)
SODIUM SERPL-SCNC: 139 MMOL/L (ref 132–146)

## 2024-06-13 PROCEDURE — 80048 BASIC METABOLIC PNL TOTAL CA: CPT

## 2024-06-13 PROCEDURE — 99214 OFFICE O/P EST MOD 30 MIN: CPT

## 2024-06-13 PROCEDURE — 83880 ASSAY OF NATRIURETIC PEPTIDE: CPT

## 2024-06-13 ASSESSMENT — PATIENT HEALTH QUESTIONNAIRE - PHQ9
SUM OF ALL RESPONSES TO PHQ QUESTIONS 1-9: 0
1. LITTLE INTEREST OR PLEASURE IN DOING THINGS: NOT AT ALL
SUM OF ALL RESPONSES TO PHQ QUESTIONS 1-9: 0
SUM OF ALL RESPONSES TO PHQ QUESTIONS 1-9: 0
SUM OF ALL RESPONSES TO PHQ9 QUESTIONS 1 & 2: 0
2. FEELING DOWN, DEPRESSED OR HOPELESS: NOT AT ALL
SUM OF ALL RESPONSES TO PHQ QUESTIONS 1-9: 0

## 2024-06-13 NOTE — RESULT ENCOUNTER NOTE
CHF clinic visit and labs reviewed  Patient requested appointment d/t not feeling well since addition of isordil/hydralazine combination (fatigue, dizziness, cold intolerance)     VS: 120/60, 65  Weight down 4 lbs and euvolemic on RN exam     Cr 1.8>>1.8>>1.9  BUN up to 5598    Stop hydralazine  Continue isordil at 5 mg three times daily   Can we see her next week for closer follow up?

## 2024-06-13 NOTE — PROGRESS NOTES
6/13/24- 1455- I called Josselyn to notify her of the new orders below per ALMA Polo. She repeated orders back to me and verbalized understanding. Bringing pt next week as an add on per request of Padmini.    _______________________________    Padmini Zendejas, APRN - CNS  Pratibha Dougherty RN  CHF clinic visit and labs reviewed  Patient requested appointment d/t not feeling well since addition of isordil/hydralazine combination (fatigue, dizziness, cold intolerance)    VS: 120/60, 65  Weight down 4 lbs and euvolemic on RN exam    Cr 1.8>>1.8>>1.9  BUN up to 5598    Stop hydralazine  Continue isordil at 5 mg three times daily  Can we see her next week for closer follow up?    ____________________________________    BOOKER Dougherty RN.

## 2024-06-13 NOTE — PROGRESS NOTES
Congestive Heart Failure Clinic   [facility]      Referring Provider: Dr. Velez  Primary Care Physician: Hari Fofana Jr., DO   Cardiologist: Dr. Velez  Nephrologist: -      HISTORY OF PRESENT ILLNESS:     Josselyn Samson is a 78 y.o. (1945) female with a history of HFrEF (EF < 40%)  Pre Cupid:     Lab Results   Component Value Date    LVEF 30 05/10/2023         She presents to the CHF clinic for ongoing evaluation and monitoring of heart failure.    In the CHF clinic today she denies any adverse symptoms except:  [] Dizziness or lightheadedness   [x] Syncope or near syncope  [] Recent Fall  [] Shortness of breath at rest   [x] Dyspnea with exertion (long distances)- she states d/t asthma  [] Decline in functional capacity (unable to perform activities they had previously been able to do)  [x] Fatigue   [] Orthopnea  [] PND  [] Nocturnal cough  [] Hemoptysis  [] Chest pain, pressure, or discomfort  [] Palpitations  [] Abdominal distention  [] Abdominal bloating  [] Early satiety  [] Blood in stool   [] Diarrhea  [] Constipation  [] Nausea/Vomiting  [] Decreased urinary response to oral diuretic   [] Scrotal swelling   [] Lower extremity edema  [] Used PRN doses of oral diuretic   [] Weight gain      Wt Readings from Last 3 Encounters:   06/13/24 64.4 kg (142 lb)   05/29/24 66.4 kg (146 lb 4.8 oz)   04/25/24 64.9 kg (143 lb)           SOCIAL HISTORY:  [x] Denies tobacco, alcohol or illicit drug abuse  [] Tobacco use:  [] ETOH use:  [] Illicit drug use:        MEDICATIONS:    Allergies   Allergen Reactions    Lisinopril Swelling     Patient states she had swelling in her face and had trouble breathing.     Other      Environmental dust, cigarette smoke, grass     Prior to Visit Medications    Medication Sig Taking? Authorizing Provider   hydrALAZINE (APRESOLINE) 10 MG tablet Take 1 tablet by mouth 3 times daily  Padmini Zendejas, APRN - CNS

## 2024-06-20 ENCOUNTER — HOSPITAL ENCOUNTER (OUTPATIENT)
Dept: OTHER | Age: 79
Setting detail: THERAPIES SERIES
End: 2024-06-20
Payer: MEDICARE

## 2024-06-27 ENCOUNTER — HOSPITAL ENCOUNTER (OUTPATIENT)
Dept: OTHER | Age: 79
Setting detail: THERAPIES SERIES
End: 2024-06-27
Payer: MEDICARE

## 2024-06-28 ENCOUNTER — APPOINTMENT (OUTPATIENT)
Dept: OTHER | Age: 79
End: 2024-06-28
Payer: MEDICARE

## 2024-07-08 ENCOUNTER — TELEPHONE (OUTPATIENT)
Dept: NON INVASIVE DIAGNOSTICS | Age: 79
End: 2024-07-08

## 2024-07-08 DIAGNOSIS — R94.31 ABNORMAL ELECTROCARDIOGRAPHY: Primary | ICD-10-CM

## 2024-07-08 NOTE — TELEPHONE ENCOUNTER
Patient called the office stating her device is making a siren sound. I reviewed [employer]'s website. Her device triggered RRT on 7/5/2024. I told her she would need to come into the office for us to turn off the alarm. She can come in tomorrow morning at 1030. I told her I will send a note to the Coordinator about scheduling her device change out.    Eryn Shaver RN, BSN  [facility] [place]   Device Clinic

## 2024-07-09 ENCOUNTER — NURSE ONLY (OUTPATIENT)
Dept: NON INVASIVE DIAGNOSTICS | Age: 79
End: 2024-07-09
Payer: MEDICARE

## 2024-07-09 DIAGNOSIS — Z95.810 ICD (IMPLANTABLE CARDIOVERTER-DEFIBRILLATOR), DUAL, IN SITU: Primary | ICD-10-CM

## 2024-07-09 PROCEDURE — 93283 PRGRMG EVAL IMPLANTABLE DFB: CPT | Performed by: INTERNAL MEDICINE

## 2024-07-11 ENCOUNTER — PREP FOR PROCEDURE (OUTPATIENT)
Dept: NON INVASIVE DIAGNOSTICS | Age: 79
End: 2024-07-11

## 2024-07-11 ENCOUNTER — HOSPITAL ENCOUNTER (OUTPATIENT)
Dept: CARDIOLOGY | Age: 79
Discharge: HOME | End: 2024-07-13
Attending: INTERNAL MEDICINE
Payer: MEDICARE

## 2024-07-11 VITALS — HEIGHT: 61 IN | WEIGHT: 142 LBS | BODY MASS INDEX: 26.81 KG/M2

## 2024-07-11 DIAGNOSIS — Z95.810 ICD (IMPLANTABLE CARDIOVERTER-DEFIBRILLATOR), DUAL, IN SITU: Primary | ICD-10-CM

## 2024-07-11 DIAGNOSIS — R94.31 ABNORMAL ELECTROCARDIOGRAPHY: ICD-10-CM

## 2024-07-11 LAB
ECHO AO ASC DIAM: 2.6 CM
ECHO AO ASCENDING AORTA INDEX: 1.6 CM/M2
ECHO AV AREA PEAK VELOCITY: 1.5 CM2
ECHO AV AREA VTI: 1.4 CM2
ECHO AV AREA/BSA PEAK VELOCITY: 0.9 CM2/M2
ECHO AV AREA/BSA VTI: 0.9 CM2/M2
ECHO AV CUSP MM: 1.8 CM
ECHO AV MEAN GRADIENT: 7 MMHG
ECHO AV MEAN VELOCITY: 1.2 M/S
ECHO AV PEAK GRADIENT: 12 MMHG
ECHO AV PEAK VELOCITY: 1.8 M/S
ECHO AV VELOCITY RATIO: 0.44
ECHO AV VTI: 41 CM
ECHO BSA: 1.66 M2
ECHO EST RA PRESSURE: 3 MMHG
ECHO LA DIAMETER INDEX: 2.21 CM/M2
ECHO LA DIAMETER: 3.6 CM
ECHO LA VOL A-L A2C: 83 ML (ref 22–52)
ECHO LA VOL A-L A4C: 70 ML (ref 22–52)
ECHO LA VOL MOD A2C: 81 ML (ref 22–52)
ECHO LA VOL MOD A4C: 66 ML (ref 22–52)
ECHO LA VOLUME AREA LENGTH: 77 ML
ECHO LA VOLUME INDEX A-L A2C: 51 ML/M2 (ref 16–34)
ECHO LA VOLUME INDEX A-L A4C: 43 ML/M2 (ref 16–34)
ECHO LA VOLUME INDEX AREA LENGTH: 47 ML/M2 (ref 16–34)
ECHO LA VOLUME INDEX MOD A2C: 50 ML/M2 (ref 16–34)
ECHO LA VOLUME INDEX MOD A4C: 40 ML/M2 (ref 16–34)
ECHO LV EJECTION FRACTION A2C: 40 %
ECHO LV EJECTION FRACTION A4C: 34 %
ECHO LV FRACTIONAL SHORTENING: 16 % (ref 28–44)
ECHO LV INTERNAL DIMENSION DIASTOLE INDEX: 3.07 CM/M2
ECHO LV INTERNAL DIMENSION DIASTOLIC: 5 CM (ref 3.9–5.3)
ECHO LV INTERNAL DIMENSION SYSTOLIC INDEX: 2.58 CM/M2
ECHO LV INTERNAL DIMENSION SYSTOLIC: 4.2 CM
ECHO LV ISOVOLUMETRIC RELAXATION TIME (IVRT): 72.7 MS
ECHO LV IVSD: 1.6 CM (ref 0.6–0.9)
ECHO LV IVSS: 1.5 CM
ECHO LV MASS 2D: 306.8 G (ref 67–162)
ECHO LV MASS INDEX 2D: 188.2 G/M2 (ref 43–95)
ECHO LV POSTERIOR WALL DIASTOLIC: 1.3 CM (ref 0.6–0.9)
ECHO LV POSTERIOR WALL SYSTOLIC: 1.8 CM
ECHO LV RELATIVE WALL THICKNESS RATIO: 0.52
ECHO LVOT AREA: 3.1 CM2
ECHO LVOT AV VTI INDEX: 0.47
ECHO LVOT DIAM: 2 CM
ECHO LVOT MEAN GRADIENT: 1 MMHG
ECHO LVOT PEAK GRADIENT: 3 MMHG
ECHO LVOT PEAK VELOCITY: 0.8 M/S
ECHO LVOT STROKE VOLUME INDEX: 37 ML/M2
ECHO LVOT SV: 60.3 ML
ECHO LVOT VTI: 19.2 CM
ECHO MV "A" WAVE DURATION: 169.6 MSEC
ECHO MV A VELOCITY: 1.18 M/S
ECHO MV AREA PHT: 2.8 CM2
ECHO MV AREA VTI: 1.7 CM2
ECHO MV E DECELERATION TIME (DT): 167.6 MS
ECHO MV E VELOCITY: 0.91 M/S
ECHO MV E/A RATIO: 0.77
ECHO MV LVOT VTI INDEX: 1.8
ECHO MV MAX VELOCITY: 1.3 M/S
ECHO MV MEAN GRADIENT: 4 MMHG
ECHO MV MEAN VELOCITY: 0.9 M/S
ECHO MV PEAK GRADIENT: 7 MMHG
ECHO MV PRESSURE HALF TIME (PHT): 78.3 MS
ECHO MV VTI: 34.5 CM
ECHO PV MAX VELOCITY: 1 M/S
ECHO PV MEAN GRADIENT: 2 MMHG
ECHO PV MEAN VELOCITY: 0.6 M/S
ECHO PV PEAK GRADIENT: 4 MMHG
ECHO PV VTI: 21.2 CM
ECHO PVEIN A DURATION: 107.3 MS
ECHO PVEIN A VELOCITY: 0.3 M/S
ECHO PVEIN PEAK D VELOCITY: 0.4 M/S
ECHO PVEIN PEAK S VELOCITY: 0.7 M/S
ECHO PVEIN S/D RATIO: 1.8
ECHO RIGHT VENTRICULAR SYSTOLIC PRESSURE (RVSP): 48 MMHG
ECHO RV INTERNAL DIMENSION: 2.4 CM
ECHO TV REGURGITANT MAX VELOCITY: 3.37 M/S
ECHO TV REGURGITANT PEAK GRADIENT: 46 MMHG

## 2024-07-11 PROCEDURE — 93306 TTE W/DOPPLER COMPLETE: CPT

## 2024-07-11 RX ORDER — SODIUM CHLORIDE 9 MG/ML
INJECTION, SOLUTION INTRAVENOUS PRN
Status: CANCELLED | OUTPATIENT
Start: 2024-07-11

## 2024-07-11 RX ORDER — SODIUM CHLORIDE 0.9 % (FLUSH) 0.9 %
5-40 SYRINGE (ML) INJECTION EVERY 12 HOURS SCHEDULED
Status: CANCELLED | OUTPATIENT
Start: 2024-07-11

## 2024-07-11 RX ORDER — SODIUM CHLORIDE 0.9 % (FLUSH) 0.9 %
5-40 SYRINGE (ML) INJECTION PRN
Status: CANCELLED | OUTPATIENT
Start: 2024-07-11

## 2024-07-21 LAB
ECHO AO ASC DIAM: 2.6 CM
ECHO AO ASCENDING AORTA INDEX: 1.6 CM/M2
ECHO AO SINUS VALSALVA DIAM: 2.5 CM
ECHO AO SINUS VALSALVA INDEX: 1.53 CM/M2
ECHO AV AREA PEAK VELOCITY: 1.5 CM2
ECHO AV AREA VTI: 1.4 CM2
ECHO AV AREA/BSA PEAK VELOCITY: 0.9 CM2/M2
ECHO AV AREA/BSA VTI: 0.9 CM2/M2
ECHO AV CUSP MM: 1.8 CM
ECHO AV MEAN GRADIENT: 7 MMHG
ECHO AV MEAN VELOCITY: 1.2 M/S
ECHO AV PEAK GRADIENT: 12 MMHG
ECHO AV PEAK VELOCITY: 1.8 M/S
ECHO AV VELOCITY RATIO: 0.44
ECHO AV VTI: 41 CM
ECHO BSA: 1.66 M2
ECHO EST RA PRESSURE: 3 MMHG
ECHO LA DIAMETER INDEX: 2.21 CM/M2
ECHO LA DIAMETER: 3.6 CM
ECHO LA VOL A-L A2C: 83 ML (ref 22–52)
ECHO LA VOL A-L A4C: 70 ML (ref 22–52)
ECHO LA VOL MOD A2C: 81 ML (ref 22–52)
ECHO LA VOL MOD A4C: 66 ML (ref 22–52)
ECHO LA VOLUME AREA LENGTH: 77 ML
ECHO LA VOLUME INDEX A-L A2C: 51 ML/M2 (ref 16–34)
ECHO LA VOLUME INDEX A-L A4C: 43 ML/M2 (ref 16–34)
ECHO LA VOLUME INDEX AREA LENGTH: 47 ML/M2 (ref 16–34)
ECHO LA VOLUME INDEX MOD A2C: 50 ML/M2 (ref 16–34)
ECHO LA VOLUME INDEX MOD A4C: 40 ML/M2 (ref 16–34)
ECHO LV EJECTION FRACTION A2C: 40 %
ECHO LV EJECTION FRACTION A4C: 34 %
ECHO LV FRACTIONAL SHORTENING: 16 % (ref 28–44)
ECHO LV INTERNAL DIMENSION DIASTOLE INDEX: 3.07 CM/M2
ECHO LV INTERNAL DIMENSION DIASTOLIC: 5 CM (ref 3.9–5.3)
ECHO LV INTERNAL DIMENSION SYSTOLIC INDEX: 2.58 CM/M2
ECHO LV INTERNAL DIMENSION SYSTOLIC: 4.2 CM
ECHO LV ISOVOLUMETRIC RELAXATION TIME (IVRT): 72.7 MS
ECHO LV IVSD: 1.6 CM (ref 0.6–0.9)
ECHO LV IVSS: 1.5 CM
ECHO LV MASS 2D: 306.8 G (ref 67–162)
ECHO LV MASS INDEX 2D: 188.2 G/M2 (ref 43–95)
ECHO LV POSTERIOR WALL DIASTOLIC: 1.3 CM (ref 0.6–0.9)
ECHO LV POSTERIOR WALL SYSTOLIC: 1.8 CM
ECHO LV RELATIVE WALL THICKNESS RATIO: 0.52
ECHO LVOT AREA: 3.1 CM2
ECHO LVOT AV VTI INDEX: 0.47
ECHO LVOT DIAM: 2 CM
ECHO LVOT MEAN GRADIENT: 1 MMHG
ECHO LVOT PEAK GRADIENT: 3 MMHG
ECHO LVOT PEAK VELOCITY: 0.8 M/S
ECHO LVOT STROKE VOLUME INDEX: 37 ML/M2
ECHO LVOT SV: 60.3 ML
ECHO LVOT VTI: 19.2 CM
ECHO MV "A" WAVE DURATION: 169.6 MSEC
ECHO MV A VELOCITY: 1.18 M/S
ECHO MV AREA PHT: 2.8 CM2
ECHO MV AREA VTI: 1.7 CM2
ECHO MV E DECELERATION TIME (DT): 167.6 MS
ECHO MV E VELOCITY: 0.91 M/S
ECHO MV E/A RATIO: 0.77
ECHO MV LVOT VTI INDEX: 1.8
ECHO MV MAX VELOCITY: 1.3 M/S
ECHO MV MEAN GRADIENT: 4 MMHG
ECHO MV MEAN VELOCITY: 0.9 M/S
ECHO MV PEAK GRADIENT: 7 MMHG
ECHO MV PRESSURE HALF TIME (PHT): 78.3 MS
ECHO MV VTI: 34.5 CM
ECHO PV MAX VELOCITY: 1 M/S
ECHO PV MEAN GRADIENT: 2 MMHG
ECHO PV MEAN VELOCITY: 0.6 M/S
ECHO PV PEAK GRADIENT: 4 MMHG
ECHO PV VTI: 21.2 CM
ECHO PVEIN A DURATION: 107.3 MS
ECHO PVEIN A VELOCITY: 0.3 M/S
ECHO PVEIN PEAK D VELOCITY: 0.4 M/S
ECHO PVEIN PEAK S VELOCITY: 0.7 M/S
ECHO PVEIN S/D RATIO: 1.8
ECHO RIGHT VENTRICULAR SYSTOLIC PRESSURE (RVSP): 48 MMHG
ECHO RV INTERNAL DIMENSION: 2.4 CM
ECHO RV TAPSE: 2.7 CM (ref 1.7–?)
ECHO TV REGURGITANT MAX VELOCITY: 3.37 M/S
ECHO TV REGURGITANT PEAK GRADIENT: 46 MMHG

## 2024-07-22 ENCOUNTER — TELEPHONE (OUTPATIENT)
Dept: NON INVASIVE DIAGNOSTICS | Age: 79
End: 2024-07-22

## 2024-07-22 NOTE — TELEPHONE ENCOUNTER
Pt notified of results and verbalized understanding.    Electronically signed by Evelin Miller MA on 7/22/2024 at 9:05 AM

## 2024-07-22 NOTE — TELEPHONE ENCOUNTER
----- Message from Doris Ennis MD sent at 7/21/2024  6:19 PM EDT -----  LV EF 40-45%. Please schedule gen change. Thanks.  ----- Message -----  From: Crow Cheung MD  Sent: 7/21/2024   7:47 AM EDT  To: Doris Ennis MD

## 2024-07-23 ENCOUNTER — ANESTHESIA EVENT (OUTPATIENT)
Age: 79
End: 2024-07-23
Payer: MEDICARE

## 2024-07-23 ENCOUNTER — HOSPITAL ENCOUNTER (OUTPATIENT)
Age: 79
Setting detail: OUTPATIENT SURGERY
Discharge: HOME | End: 2024-07-23
Attending: INTERNAL MEDICINE | Admitting: INTERNAL MEDICINE
Payer: MEDICARE

## 2024-07-23 ENCOUNTER — APPOINTMENT (OUTPATIENT)
Dept: GENERAL RADIOLOGY | Age: 79
End: 2024-07-23
Attending: INTERNAL MEDICINE
Payer: MEDICARE

## 2024-07-23 ENCOUNTER — ANESTHESIA (OUTPATIENT)
Age: 79
End: 2024-07-23
Payer: MEDICARE

## 2024-07-23 VITALS
HEART RATE: 69 BPM | RESPIRATION RATE: 18 BRPM | DIASTOLIC BLOOD PRESSURE: 73 MMHG | TEMPERATURE: 97.4 F | OXYGEN SATURATION: 95 % | SYSTOLIC BLOOD PRESSURE: 176 MMHG

## 2024-07-23 DIAGNOSIS — Z45.018 FITTING AND ADJUSTMENT OF CARDIAC PACEMAKER: ICD-10-CM

## 2024-07-23 PROBLEM — Z45.02 IMPLANTABLE CARDIOVERTER-DEFIBRILLATOR (ICD) AT END OF BATTERY LIFE: Status: ACTIVE | Noted: 2024-07-23

## 2024-07-23 LAB
ALBUMIN SERPL-MCNC: 3.7 G/DL (ref 3.5–5.2)
ALP SERPL-CCNC: 65 U/L (ref 35–104)
ALT SERPL-CCNC: 30 U/L (ref 0–32)
ANION GAP SERPL CALCULATED.3IONS-SCNC: 9 MMOL/L (ref 7–16)
AST SERPL-CCNC: 31 U/L (ref 0–31)
BILIRUB SERPL-MCNC: 0.6 MG/DL (ref 0–1.2)
BUN SERPL-MCNC: 21 MG/DL (ref 6–23)
CALCIUM SERPL-MCNC: 9.4 MG/DL (ref 8.6–10.2)
CHLORIDE SERPL-SCNC: 103 MMOL/L (ref 98–107)
CO2 SERPL-SCNC: 29 MMOL/L (ref 22–29)
CREAT SERPL-MCNC: 1.6 MG/DL (ref 0.5–1)
EKG ATRIAL RATE: 61 BPM
EKG P AXIS: 39 DEGREES
EKG P-R INTERVAL: 204 MS
EKG Q-T INTERVAL: 442 MS
EKG QRS DURATION: 110 MS
EKG QTC CALCULATION (BAZETT): 444 MS
EKG R AXIS: -3 DEGREES
EKG T AXIS: 35 DEGREES
EKG VENTRICULAR RATE: 61 BPM
ERYTHROCYTE [DISTWIDTH] IN BLOOD BY AUTOMATED COUNT: 13.9 % (ref 11.5–15)
GFR, ESTIMATED: 32 ML/MIN/1.73M2
GLUCOSE SERPL-MCNC: 92 MG/DL (ref 74–99)
HCT VFR BLD AUTO: 37.6 % (ref 34–48)
HGB BLD-MCNC: 11.5 G/DL (ref 11.5–15.5)
MCH RBC QN AUTO: 30.7 PG (ref 26–35)
MCHC RBC AUTO-ENTMCNC: 30.6 G/DL (ref 32–34.5)
MCV RBC AUTO: 100.5 FL (ref 80–99.9)
PLATELET # BLD AUTO: 205 K/UL (ref 130–450)
PMV BLD AUTO: 10.4 FL (ref 7–12)
POTASSIUM SERPL-SCNC: 4.9 MMOL/L (ref 3.5–5)
PROT SERPL-MCNC: 6.7 G/DL (ref 6.4–8.3)
RBC # BLD AUTO: 3.74 M/UL (ref 3.5–5.5)
SODIUM SERPL-SCNC: 141 MMOL/L (ref 132–146)
WBC OTHER # BLD: 9.5 K/UL (ref 4.5–11.5)

## 2024-07-23 PROCEDURE — 2720000010 HC SURG SUPPLY STERILE: Performed by: INTERNAL MEDICINE

## 2024-07-23 PROCEDURE — 93005 ELECTROCARDIOGRAM TRACING: CPT | Performed by: INTERNAL MEDICINE

## 2024-07-23 PROCEDURE — 33263 RMVL & RPLCMT DFB GEN 2 LEAD: CPT | Performed by: INTERNAL MEDICINE

## 2024-07-23 PROCEDURE — 7100000011 HC PHASE II RECOVERY - ADDTL 15 MIN: Performed by: INTERNAL MEDICINE

## 2024-07-23 PROCEDURE — C1889 IMPLANT/INSERT DEVICE, NOC: HCPCS | Performed by: INTERNAL MEDICINE

## 2024-07-23 PROCEDURE — 80053 COMPREHEN METABOLIC PANEL: CPT

## 2024-07-23 PROCEDURE — 3700000000 HC ANESTHESIA ATTENDED CARE: Performed by: INTERNAL MEDICINE

## 2024-07-23 PROCEDURE — 85027 COMPLETE CBC AUTOMATED: CPT

## 2024-07-23 PROCEDURE — 2500000003 HC RX 250 WO HCPCS: Performed by: INTERNAL MEDICINE

## 2024-07-23 PROCEDURE — 3700000001 HC ADD 15 MINUTES (ANESTHESIA): Performed by: INTERNAL MEDICINE

## 2024-07-23 PROCEDURE — 6360000002 HC RX W HCPCS: Performed by: NURSE ANESTHETIST, CERTIFIED REGISTERED

## 2024-07-23 PROCEDURE — 2709999900 HC NON-CHARGEABLE SUPPLY: Performed by: INTERNAL MEDICINE

## 2024-07-23 PROCEDURE — C1721 AICD, DUAL CHAMBER: HCPCS | Performed by: INTERNAL MEDICINE

## 2024-07-23 PROCEDURE — 2580000003 HC RX 258: Performed by: INTERNAL MEDICINE

## 2024-07-23 PROCEDURE — 7100000010 HC PHASE II RECOVERY - FIRST 15 MIN: Performed by: INTERNAL MEDICINE

## 2024-07-23 PROCEDURE — 71045 X-RAY EXAM CHEST 1 VIEW: CPT

## 2024-07-23 DEVICE — ENVELOPE CMRM6133 ABSORB LRG MR
Type: IMPLANTABLE DEVICE | Site: CHEST | Status: FUNCTIONAL
Brand: TYRX™

## 2024-07-23 DEVICE — ICD DDPA2D1 COBALT XT DR MRI DF1 USA
Type: IMPLANTABLE DEVICE | Site: CHEST | Status: FUNCTIONAL
Brand: COBALT™ XT DR MRI SURESCAN™

## 2024-07-23 RX ORDER — ONDANSETRON 2 MG/ML
INJECTION INTRAMUSCULAR; INTRAVENOUS PRN
Status: DISCONTINUED | OUTPATIENT
Start: 2024-07-23 | End: 2024-07-23 | Stop reason: SDUPTHER

## 2024-07-23 RX ORDER — PROPOFOL 10 MG/ML
INJECTION, EMULSION INTRAVENOUS PRN
Status: DISCONTINUED | OUTPATIENT
Start: 2024-07-23 | End: 2024-07-23 | Stop reason: SDUPTHER

## 2024-07-23 RX ORDER — SODIUM CHLORIDE 0.9 % (FLUSH) 0.9 %
5-40 SYRINGE (ML) INJECTION PRN
Status: DISCONTINUED | OUTPATIENT
Start: 2024-07-23 | End: 2024-07-23 | Stop reason: HOSPADM

## 2024-07-23 RX ORDER — DOXYCYCLINE HYCLATE 100 MG
100 TABLET ORAL 2 TIMES DAILY
Qty: 14 TABLET | Refills: 0 | Status: SHIPPED | OUTPATIENT
Start: 2024-07-23 | End: 2024-07-30

## 2024-07-23 RX ORDER — SODIUM CHLORIDE 9 MG/ML
INJECTION, SOLUTION INTRAVENOUS PRN
Status: DISCONTINUED | OUTPATIENT
Start: 2024-07-23 | End: 2024-07-23 | Stop reason: HOSPADM

## 2024-07-23 RX ORDER — KETOROLAC TROMETHAMINE 30 MG/ML
INJECTION, SOLUTION INTRAMUSCULAR; INTRAVENOUS PRN
Status: DISCONTINUED | OUTPATIENT
Start: 2024-07-23 | End: 2024-07-23 | Stop reason: SDUPTHER

## 2024-07-23 RX ORDER — CEFAZOLIN SODIUM 1 G/3ML
INJECTION, POWDER, FOR SOLUTION INTRAMUSCULAR; INTRAVENOUS PRN
Status: DISCONTINUED | OUTPATIENT
Start: 2024-07-23 | End: 2024-07-23 | Stop reason: SDUPTHER

## 2024-07-23 RX ORDER — SODIUM CHLORIDE 0.9 % (FLUSH) 0.9 %
5-40 SYRINGE (ML) INJECTION EVERY 12 HOURS SCHEDULED
Status: DISCONTINUED | OUTPATIENT
Start: 2024-07-23 | End: 2024-07-23 | Stop reason: HOSPADM

## 2024-07-23 RX ORDER — SODIUM CHLORIDE 0.9 % (FLUSH) 0.9 %
5-40 SYRINGE (ML) INJECTION PRN
Status: CANCELLED | OUTPATIENT
Start: 2024-07-23

## 2024-07-23 RX ORDER — ONDANSETRON 2 MG/ML
4 INJECTION INTRAMUSCULAR; INTRAVENOUS EVERY 6 HOURS PRN
Status: CANCELLED | OUTPATIENT
Start: 2024-07-23

## 2024-07-23 RX ORDER — SODIUM CHLORIDE 0.9 % (FLUSH) 0.9 %
5-40 SYRINGE (ML) INJECTION EVERY 12 HOURS SCHEDULED
Status: CANCELLED | OUTPATIENT
Start: 2024-07-23

## 2024-07-23 RX ORDER — SODIUM CHLORIDE 9 MG/ML
INJECTION, SOLUTION INTRAVENOUS PRN
Status: CANCELLED | OUTPATIENT
Start: 2024-07-23

## 2024-07-23 RX ORDER — MIDAZOLAM HYDROCHLORIDE 1 MG/ML
INJECTION INTRAMUSCULAR; INTRAVENOUS PRN
Status: DISCONTINUED | OUTPATIENT
Start: 2024-07-23 | End: 2024-07-23 | Stop reason: SDUPTHER

## 2024-07-23 RX ORDER — FENTANYL CITRATE 50 UG/ML
INJECTION, SOLUTION INTRAMUSCULAR; INTRAVENOUS PRN
Status: DISCONTINUED | OUTPATIENT
Start: 2024-07-23 | End: 2024-07-23 | Stop reason: SDUPTHER

## 2024-07-23 RX ADMIN — MIDAZOLAM 1 MG: 1 INJECTION INTRAMUSCULAR; INTRAVENOUS at 15:47

## 2024-07-23 RX ADMIN — MIDAZOLAM 1 MG: 1 INJECTION INTRAMUSCULAR; INTRAVENOUS at 15:40

## 2024-07-23 RX ADMIN — FENTANYL CITRATE 50 MCG: 50 INJECTION, SOLUTION INTRAMUSCULAR; INTRAVENOUS at 15:47

## 2024-07-23 RX ADMIN — KETOROLAC TROMETHAMINE 30 MG: 30 INJECTION, SOLUTION INTRAMUSCULAR; INTRAVENOUS at 16:28

## 2024-07-23 RX ADMIN — FENTANYL CITRATE 50 MCG: 50 INJECTION, SOLUTION INTRAMUSCULAR; INTRAVENOUS at 15:40

## 2024-07-23 RX ADMIN — CEFAZOLIN 2 G: 1 INJECTION, POWDER, FOR SOLUTION INTRAMUSCULAR; INTRAVENOUS at 15:40

## 2024-07-23 RX ADMIN — ONDANSETRON HYDROCHLORIDE 4 MG: 2 INJECTION, SOLUTION INTRAMUSCULAR; INTRAVENOUS at 16:45

## 2024-07-23 RX ADMIN — PROPOFOL 50 MG: 10 INJECTION, EMULSION INTRAVENOUS at 15:41

## 2024-07-23 RX ADMIN — SODIUM CHLORIDE: 9 INJECTION, SOLUTION INTRAVENOUS at 15:32

## 2024-07-23 RX ADMIN — PROPOFOL 25 MCG/KG/MIN: 10 INJECTION, EMULSION INTRAVENOUS at 15:53

## 2024-07-23 RX ADMIN — PROPOFOL 50 MG: 10 INJECTION, EMULSION INTRAVENOUS at 15:48

## 2024-07-23 NOTE — ANESTHESIA PRE PROCEDURE
Department of Anesthesiology  Preprocedure Note       Name:  Josselyn Samson   Age:  78 y.o.  :  1945                                          MRN:  [id number]         Date:  2024      Surgeon: Surgeon(s):  Doris Ennis MD    Procedure: Procedure(s):  REMOVE & REPLACE ICD PULSE GEN - DUAL LEADS    Medications prior to admission:   Prior to Admission medications    Medication Sig Start Date End Date Taking? Authorizing Provider   hydrALAZINE (APRESOLINE) 10 MG tablet Take 1 tablet by mouth 3 times daily 24  Padmini Zendejas, APRN - CNS   isosorbide dinitrate (ISORDIL) 10 MG tablet Take 0.5 tablets by mouth 3 times daily 24   Padmini Zendejas APRN - CNS   metoprolol succinate (TOPROL XL) 50 MG extended release tablet Take 1 tablet by mouth 2 times daily 24   Magnus Zuniga, PATRICE - CNP   budesonide-formoterol (SYMBICORT) 80-4.5 MCG/ACT AERO Inhale 1 puff into the lungs daily  Patient not taking: Reported on 2024    Kodi Mederos MD   spironolactone (ALDACTONE) 25 MG tablet Take 0.5 tablets by mouth daily 3/25/24   Padmini Zendejas APRN - CNS   levothyroxine (SYNTHROID) 75 MCG tablet Take 1 tablet by mouth Daily 23   Kodi Mederos MD   furosemide (LASIX) 20 MG tablet Take 1 tablet by mouth daily    Kodi Mederos MD   albuterol sulfate HFA (PROVENTIL;VENTOLIN;PROAIR) 108 (90 Base) MCG/ACT inhaler Inhale 2 puffs into the lungs as needed For wheezing 8/3/20   Kodi Mederos MD   acetaminophen (TYLENOL) 650 MG extended release tablet Take 2 tablets by mouth daily    Kodi Mederos MD   pantoprazole (PROTONIX) 40 MG tablet Take 1 tablet by mouth daily    Kodi Mederos MD   rosuvastatin (CRESTOR) 40 MG tablet Take 1 tablet by mouth every evening 20mg DAILY    Kodi Mederos MD   aspirin 81 MG tablet Take 1 tablet by mouth daily Indications: Restart aspirin tomorrow Takes in the evening    Kodi Mederos MD

## 2024-07-23 NOTE — DISCHARGE INSTRUCTIONS
[facility] ICD Discharge Instructions      Medications:     Incision Care:  Leave brown Aquacel dressing on for 1 week.  Remove aquacel dressing on Tuesday 7/30/24.  Do not remove the steri strips from your incision.  They will be removed at your follow up appointment.  Keep the incision dry. You may shower; but do not let the water run directly on the incision for 1 week.  Check the area daily.   Notify the office at [phone] if you develop any redness, swelling, drainage, warmth, or fever greater than 100 degrees.    Activity:  You may continue regular activity; but limit strenuous or stretching movements of the arm closest to your ICD.   Wear the arm immobilizer at all times for 48 hours and then at night for 4 weeks.    Avoid pulling yourself up with that arm.   Do not raise that elbow higher than shoulder height and do not lift anything weighing more than 2 pounds for 4 weeks.    Do not do any activities such as golfing, vacuuming, or mowing the lawn for 4 weeks.   Prevent any hard blows to the ICD.      Driving:  You may drive, if you feel up to it, in 14 days.  Start with local/short trips to familiar places. Avoid highway/ high-speed driving for the first few days after you resume driving.  DO NOT drive until you have stopped taking prescription pain medications.      Possible Defibrillator Interferences:  Avoid high frequency ham radios, arc welders, battery powered tools, and strong electromagnetic fields.    Avoid any device that may electrically stimulate your body; such as electric muscle stimulators or TENS units.    Standing over a running car motor with the woodward up may inhibit the ICD.    When using a cell phone, use the ear opposite the side of your ICD if possible    Special Instructions:  Let your dentist, doctor, or medical specialist know that you have an ICD so precautions can be taken to protect the defibrillator.    You should NOT have an MRI if have defibrillator unless

## 2024-07-24 NOTE — ANESTHESIA POSTPROCEDURE EVALUATION
Department of Anesthesiology  Postprocedure Note    Patient: Josselyn Samson  MRN: [id number]  YOB: 1945  Date of evaluation: 7/23/2024    Procedure Summary       Date: 07/23/24 Room / Location: [facility] 1 / [facility]    Anesthesia Start: 1529 Anesthesia Stop: 1645    Procedure: REMOVE & REPLACE ICD PULSE GEN - DUAL LEADS Diagnosis:       Fitting and adjustment of cardiac pacemaker      (Fitting and adjustment of cardiac pacemaker [Z45.018])    Providers: Doris Ennis MD Responsible Provider: Any William MD    Anesthesia Type: MAC ASA Status: 4            Anesthesia Type: No value filed.    Stormy Phase I:      Stormy Phase II:      Anesthesia Post Evaluation    Patient location during evaluation: PACU  Patient participation: complete - patient participated  Level of consciousness: awake  Pain score: 0  Airway patency: patent  Nausea & Vomiting: no nausea  Cardiovascular status: hemodynamically stable  Respiratory status: acceptable  Hydration status: stable  Multimodal analgesia pain management approach    There were no known notable events for this encounter.

## 2024-07-25 LAB
EKG ATRIAL RATE: 73 BPM
EKG P AXIS: 53 DEGREES
EKG P-R INTERVAL: 164 MS
EKG Q-T INTERVAL: 418 MS
EKG QRS DURATION: 106 MS
EKG QTC CALCULATION (BAZETT): 460 MS
EKG R AXIS: -7 DEGREES
EKG T AXIS: 56 DEGREES
EKG VENTRICULAR RATE: 73 BPM

## 2024-07-31 ENCOUNTER — TELEPHONE (OUTPATIENT)
Age: 79
End: 2024-07-31

## 2024-07-31 NOTE — TELEPHONE ENCOUNTER
I called Josselyn to see how she's doing since her ICD-GR.  There was no answer.  I left a message reminding her to remove the aqaucel dressing if she did not yesterday, to leave the steri-strips intact, & to report any redness, bleeding, drainage, swelling, or fevers to Dr. Ennis's office. I also reminded her of her follow up appt at the Device Clinic on Tuesday 8/6/24 at 1:00 pm.  I left my number ([phone]) if she wishes to call me back.

## 2024-08-06 ENCOUNTER — NURSE ONLY (OUTPATIENT)
Dept: NON INVASIVE DIAGNOSTICS | Age: 79
End: 2024-08-06
Payer: MEDICARE

## 2024-08-06 DIAGNOSIS — I42.8 NONISCHEMIC CARDIOMYOPATHY (HCC): ICD-10-CM

## 2024-08-06 DIAGNOSIS — Z95.810 ICD (IMPLANTABLE CARDIOVERTER-DEFIBRILLATOR), DUAL, IN SITU: Primary | ICD-10-CM

## 2024-08-06 PROCEDURE — 93283 PRGRMG EVAL IMPLANTABLE DFB: CPT | Performed by: INTERNAL MEDICINE

## 2024-08-06 NOTE — PATIENT INSTRUCTIONS
You may shower starting now    Call if any signs or symptoms of infection [phone] ext: [phone]  Fevers, chills, redness, swelling or drainage.       Hook up home  Monitor  Man[employer] Stay connected: [phone]

## 2024-08-07 NOTE — PROGRESS NOTES
See Murj report.    Eryn Shaver RN, BSN  [facility] [place]   Device Clinic

## 2025-02-07 PROCEDURE — 93295 DEV INTERROG REMOTE 1/2/MLT: CPT | Performed by: INTERNAL MEDICINE

## 2025-02-07 PROCEDURE — 93296 REM INTERROG EVL PM/IDS: CPT | Performed by: INTERNAL MEDICINE

## 2025-04-01 NOTE — PROGRESS NOTES
[facility]  Outpatient Progress Note  Josselyn Samson  1945  Date of Service: 4/4/2025  PCP: Hari Fofana Jr., DO  Cardiologist:   Electrophysiologist: Dr. Ennis             Subjective: Josselyn Samson is seen for follow-up and management of: ICD in situ ( MDT) s/p generator change on 7/23/24    Last seen in the office with Milagro Agee on 4/19/24    PMH as noted below significant for primary prevention ICD implanted in 2007 for primary prevention for sudden cardiac death and had a generator change in 2014. She reports feeling overall well and offers no complaints from a device POV. The device site looks well healed and free from infection or erosion.    The patient denies any chest pain, palpitations, dizziness, syncope, orthopnea or paroxysmal nocturnal dyspnea. The patient continues to be followed remotely.  She has felt overall good.  She presents today and is s/p generator change on 7/23/24 by . Her device site looks well healed and free from infection or erosion. She offers no complaints from a device POV. The patient presents today in NSR. Currently denies any angina, syncope, dyspnea on exertion, paroxysmal nocturnal dyspnea and palpitations. The patient continues to be followed remotely.        Patient Active Problem List    Diagnosis Date Noted    Implantable cardioverter-defibrillator (ICD) at end of battery life 07/23/2024    Nonischemic cardiomyopathy (HCC) 07/25/2018    Presence of drug coated stent in LAD coronary artery 07/25/2018    ICD (implantable cardioverter-defibrillator) in place 07/25/2018       Past Medical History:   Diagnosis Date    Asthma     Automatic implantable cardioverter defibrillator at end of life     CAD (coronary artery disease)     Cardiomyopathy     COPD (chronic obstructive pulmonary disease) (HCC)     on O2 4 liters continuously    GERD (gastroesophageal reflux disease)

## 2025-04-04 ENCOUNTER — OFFICE VISIT (OUTPATIENT)
Dept: NON INVASIVE DIAGNOSTICS | Age: 80
End: 2025-04-04
Payer: MEDICARE

## 2025-04-04 VITALS
OXYGEN SATURATION: 93 % | SYSTOLIC BLOOD PRESSURE: 136 MMHG | RESPIRATION RATE: 16 BRPM | BODY MASS INDEX: 29.11 KG/M2 | HEIGHT: 59 IN | WEIGHT: 144.4 LBS | TEMPERATURE: 97.5 F | HEART RATE: 68 BPM | DIASTOLIC BLOOD PRESSURE: 70 MMHG

## 2025-04-04 DIAGNOSIS — I49.9 IRREGULAR HEART BEAT: ICD-10-CM

## 2025-04-04 DIAGNOSIS — Z95.810 ICD (IMPLANTABLE CARDIOVERTER-DEFIBRILLATOR) IN PLACE: Primary | ICD-10-CM

## 2025-04-04 DIAGNOSIS — I42.8 NONISCHEMIC CARDIOMYOPATHY (HCC): ICD-10-CM

## 2025-04-04 PROCEDURE — 93000 ELECTROCARDIOGRAM COMPLETE: CPT | Performed by: INTERNAL MEDICINE

## 2025-04-08 ENCOUNTER — OFFICE VISIT (OUTPATIENT)
Dept: CARDIOLOGY CLINIC | Age: 80
End: 2025-04-08
Payer: MEDICARE

## 2025-04-08 VITALS
HEIGHT: 59 IN | SYSTOLIC BLOOD PRESSURE: 124 MMHG | WEIGHT: 144 LBS | HEART RATE: 72 BPM | BODY MASS INDEX: 29.03 KG/M2 | DIASTOLIC BLOOD PRESSURE: 70 MMHG

## 2025-04-08 DIAGNOSIS — I34.0 NONRHEUMATIC MITRAL VALVE REGURGITATION: ICD-10-CM

## 2025-04-08 DIAGNOSIS — I25.83 CORONARY ARTERY DISEASE DUE TO LIPID RICH PLAQUE: Primary | ICD-10-CM

## 2025-04-08 DIAGNOSIS — I25.10 CORONARY ARTERY DISEASE DUE TO LIPID RICH PLAQUE: Primary | ICD-10-CM

## 2025-04-08 PROCEDURE — 1159F MED LIST DOCD IN RCRD: CPT | Performed by: INTERNAL MEDICINE

## 2025-04-08 PROCEDURE — G8427 DOCREV CUR MEDS BY ELIG CLIN: HCPCS | Performed by: INTERNAL MEDICINE

## 2025-04-08 PROCEDURE — 1123F ACP DISCUSS/DSCN MKR DOCD: CPT | Performed by: INTERNAL MEDICINE

## 2025-04-08 PROCEDURE — 1090F PRES/ABSN URINE INCON ASSESS: CPT | Performed by: INTERNAL MEDICINE

## 2025-04-08 PROCEDURE — 1036F TOBACCO NON-USER: CPT | Performed by: INTERNAL MEDICINE

## 2025-04-08 PROCEDURE — G8400 PT W/DXA NO RESULTS DOC: HCPCS | Performed by: INTERNAL MEDICINE

## 2025-04-08 PROCEDURE — 99214 OFFICE O/P EST MOD 30 MIN: CPT | Performed by: INTERNAL MEDICINE

## 2025-04-08 PROCEDURE — G8417 CALC BMI ABV UP PARAM F/U: HCPCS | Performed by: INTERNAL MEDICINE

## 2025-04-08 PROCEDURE — 93000 ELECTROCARDIOGRAM COMPLETE: CPT | Performed by: INTERNAL MEDICINE

## 2025-04-08 RX ORDER — HYDRALAZINE HYDROCHLORIDE 10 MG/1
10 TABLET, FILM COATED ORAL 3 TIMES DAILY
Qty: 90 TABLET | Refills: 5 | Status: SHIPPED | OUTPATIENT
Start: 2025-04-08

## 2025-04-08 ASSESSMENT — ENCOUNTER SYMPTOMS
VOMITING: 0
ABDOMINAL PAIN: 0
COUGH: 0
WHEEZING: 0
DIARRHEA: 0
BACK PAIN: 0
NAUSEA: 0
SHORTNESS OF BREATH: 0
BLOOD IN STOOL: 0
CONSTIPATION: 0

## 2025-04-08 NOTE — PROGRESS NOTES
disease) (HCC)     on O2 4 liters continuously    GERD (gastroesophageal reflux disease)     Hyperlipidemia     Hypertension     ICD (implantable cardioverter-defibrillator) in place 7/25/2018    PONV (postoperative nausea and vomiting)     Presence of drug coated stent in LAD coronary artery 7/25/2018    Pulmonary artery hypertension (HCC)     Sleep apnea     on oxygen 4 liters cont    Thyroid disease     VHD (valvular heart disease)        Past Surgical History:  Past Surgical History:   Procedure Laterality Date    APPENDECTOMY      BACK SURGERY      X2    CARDIAC DEFIBRILLATOR PLACEMENT      medtronic    CATARACT REMOVAL WITH IMPLANT Bilateral     COLONOSCOPY N/A 3/31/2021    COLONOSCOPY POLYPECTOMY SNARE/COLD BIOPSY performed [place] ARENAS [place] [facility]    COLONOSCOPY  3/31/2021    COLONOSCOPY CONTROL HEMORRHAGE performed by Michael ARENAS MD [place] [facility]    DIAGNOSTIC CARDIAC CATH LAB PROCEDURE  11/01/2017    6 stents    EP DEVICE PROCEDURE N/A 7/23/2024    REMOVE & REPLACE ICD PULSE GEN - DUAL LEADS performed by Doris Ennis MD at [facility]    FINGER TRIGGER RELEASE Right 5/8/2019    RIGHT RING A, MASS EXCISION. RIGHT RING A, JORGE ALBERTO RELEASE ([id number]) performed by Berry Wilhelm MD at [facility] OR    GALLBLADDER SURGERY      HERNIA REPAIR      PACEMAKER PLACEMENT      medtronic    TONSILLECTOMY      TRANSTHORACIC ECHOCARDIOGRAM  10/05/2017       Family History:  Family History   Problem Relation Age of Onset    Cancer Mother     Heart Attack Father     Lung Cancer Father        Social History:  Social History     Socioeconomic History    Marital status:      Spouse name: Not on file    Number of children: Not on file    Years of education: Not on file    Highest education level: Not on file   Occupational History    Not on file   Tobacco Use    Smoking status: Former     Current packs/day: 0.00     Average packs/day: 2.5 packs/day for 6.0 years (15.0 ttl

## 2025-04-09 ENCOUNTER — TELEPHONE (OUTPATIENT)
Dept: CARDIOLOGY | Age: 80
End: 2025-04-09

## 2025-04-09 NOTE — TELEPHONE ENCOUNTER
Called pt and left message to schedule echo.    Electronically signed by Pam Mcmahon on 4/9/2025 at 9:33 AM

## 2025-04-23 RX ORDER — ISOSORBIDE DINITRATE 10 MG/1
5 TABLET ORAL 3 TIMES DAILY
Qty: 90 TABLET | Refills: 3 | Status: SHIPPED | OUTPATIENT
Start: 2025-04-23

## 2025-05-01 ENCOUNTER — HOSPITAL ENCOUNTER (OUTPATIENT)
Dept: CARDIOLOGY | Age: 80
Discharge: HOME | End: 2025-05-03
Attending: INTERNAL MEDICINE
Payer: MEDICARE

## 2025-05-01 VITALS
DIASTOLIC BLOOD PRESSURE: 70 MMHG | SYSTOLIC BLOOD PRESSURE: 124 MMHG | HEIGHT: 61 IN | BODY MASS INDEX: 26.62 KG/M2 | WEIGHT: 141 LBS

## 2025-05-01 DIAGNOSIS — I34.0 NONRHEUMATIC MITRAL VALVE REGURGITATION: ICD-10-CM

## 2025-05-01 PROCEDURE — 93306 TTE W/DOPPLER COMPLETE: CPT

## 2025-05-02 LAB
ECHO AR MAX VEL PISA: 2.6 M/S
ECHO AV AREA PEAK VELOCITY: 2.7 CM2
ECHO AV AREA VTI: 2.3 CM2
ECHO AV AREA/BSA PEAK VELOCITY: 1.7 CM2/M2
ECHO AV AREA/BSA VTI: 1.4 CM2/M2
ECHO AV CUSP MM: 1.4 CM
ECHO AV MEAN GRADIENT: 6 MMHG
ECHO AV MEAN VELOCITY: 1.2 M/S
ECHO AV PEAK GRADIENT: 12 MMHG
ECHO AV PEAK VELOCITY: 1.7 M/S
ECHO AV REGURGITANT PHT: 573.1 MS
ECHO AV VELOCITY RATIO: 0.59
ECHO AV VTI: 38.6 CM
ECHO BSA: 1.66 M2
ECHO EST RA PRESSURE: 3 MMHG
ECHO LA DIAMETER INDEX: 2.45 CM/M2
ECHO LA DIAMETER: 4 CM
ECHO LA VOL A-L A2C: 56 ML (ref 22–52)
ECHO LA VOL A-L A4C: 33 ML (ref 22–52)
ECHO LA VOL MOD A2C: 53 ML (ref 22–52)
ECHO LA VOL MOD A4C: 30 ML (ref 22–52)
ECHO LA VOLUME AREA LENGTH: 45 ML
ECHO LA VOLUME INDEX A-L A2C: 34 ML/M2 (ref 16–34)
ECHO LA VOLUME INDEX A-L A4C: 20 ML/M2 (ref 16–34)
ECHO LA VOLUME INDEX AREA LENGTH: 28 ML/M2 (ref 16–34)
ECHO LA VOLUME INDEX MOD A2C: 33 ML/M2 (ref 16–34)
ECHO LA VOLUME INDEX MOD A4C: 18 ML/M2 (ref 16–34)
ECHO LV EDV A2C: 97 ML
ECHO LV EDV A4C: 52 ML
ECHO LV EDV BP: 70 ML (ref 56–104)
ECHO LV EDV INDEX A4C: 32 ML/M2
ECHO LV EDV INDEX BP: 43 ML/M2
ECHO LV EDV NDEX A2C: 60 ML/M2
ECHO LV EF PHYSICIAN: 40 %
ECHO LV EJECTION FRACTION A2C: 27 %
ECHO LV EJECTION FRACTION A4C: 53 %
ECHO LV EJECTION FRACTION BIPLANE: 37 % (ref 55–100)
ECHO LV ESV A2C: 71 ML
ECHO LV ESV A4C: 24 ML
ECHO LV ESV BP: 44 ML (ref 19–49)
ECHO LV ESV INDEX A2C: 44 ML/M2
ECHO LV ESV INDEX A4C: 15 ML/M2
ECHO LV ESV INDEX BP: 27 ML/M2
ECHO LV FRACTIONAL SHORTENING: 21 % (ref 28–44)
ECHO LV INTERNAL DIMENSION DIASTOLE INDEX: 2.58 CM/M2
ECHO LV INTERNAL DIMENSION DIASTOLIC: 4.2 CM (ref 3.9–5.3)
ECHO LV INTERNAL DIMENSION SYSTOLIC INDEX: 2.02 CM/M2
ECHO LV INTERNAL DIMENSION SYSTOLIC: 3.3 CM
ECHO LV IVSD: 1.2 CM (ref 0.6–0.9)
ECHO LV MASS 2D: 178.2 G (ref 67–162)
ECHO LV MASS INDEX 2D: 109.3 G/M2 (ref 43–95)
ECHO LV POSTERIOR WALL DIASTOLIC: 1.2 CM (ref 0.6–0.9)
ECHO LV RELATIVE WALL THICKNESS RATIO: 0.57
ECHO LVOT AREA: 4.5 CM2
ECHO LVOT AV VTI INDEX: 0.5
ECHO LVOT DIAM: 2.4 CM
ECHO LVOT MEAN GRADIENT: 2 MMHG
ECHO LVOT PEAK GRADIENT: 4 MMHG
ECHO LVOT PEAK VELOCITY: 1 M/S
ECHO LVOT STROKE VOLUME INDEX: 53.3 ML/M2
ECHO LVOT SV: 86.8 ML
ECHO LVOT VTI: 19.2 CM
ECHO MV A VELOCITY: 0.69 M/S
ECHO MV AREA PHT: 3.2 CM2
ECHO MV AREA VTI: 2.7 CM2
ECHO MV E DECELERATION TIME (DT): 313.1 MS
ECHO MV E VELOCITY: 0.5 M/S
ECHO MV E/A RATIO: 0.72
ECHO MV LVOT VTI INDEX: 1.68
ECHO MV MAX VELOCITY: 1 M/S
ECHO MV MEAN GRADIENT: 1 MMHG
ECHO MV MEAN VELOCITY: 0.5 M/S
ECHO MV PEAK GRADIENT: 4 MMHG
ECHO MV PRESSURE HALF TIME (PHT): 69.7 MS
ECHO MV VTI: 32.3 CM
ECHO PV MAX VELOCITY: 1 M/S
ECHO PV MEAN GRADIENT: 2 MMHG
ECHO PV MEAN VELOCITY: 0.6 M/S
ECHO PV PEAK GRADIENT: 4 MMHG
ECHO PV VTI: 18.6 CM
ECHO RIGHT VENTRICULAR SYSTOLIC PRESSURE (RVSP): 30 MMHG
ECHO RV INTERNAL DIMENSION: 2.2 CM
ECHO RV TAPSE: 2 CM (ref 1.7–?)
ECHO TV REGURGITANT MAX VELOCITY: 2.62 M/S
ECHO TV REGURGITANT PEAK GRADIENT: 27 MMHG

## 2025-05-09 PROCEDURE — 93296 REM INTERROG EVL PM/IDS: CPT | Performed by: INTERNAL MEDICINE

## 2025-05-09 PROCEDURE — 93295 DEV INTERROG REMOTE 1/2/MLT: CPT | Performed by: INTERNAL MEDICINE

## 2025-05-12 ENCOUNTER — TELEPHONE (OUTPATIENT)
Dept: CARDIOLOGY CLINIC | Age: 80
End: 2025-05-12

## 2025-05-20 RX ORDER — ISOSORBIDE DINITRATE 10 MG/1
TABLET ORAL
Qty: 90 TABLET | Refills: 3 | OUTPATIENT
Start: 2025-05-20

## 2025-06-26 ENCOUNTER — OFFICE VISIT (OUTPATIENT)
Dept: SURGERY | Age: 80
End: 2025-06-26
Payer: MEDICARE

## 2025-06-26 VITALS
DIASTOLIC BLOOD PRESSURE: 80 MMHG | RESPIRATION RATE: 24 BRPM | SYSTOLIC BLOOD PRESSURE: 128 MMHG | HEART RATE: 80 BPM | TEMPERATURE: 97.8 F | OXYGEN SATURATION: 96 %

## 2025-06-26 DIAGNOSIS — L98.9 SKIN LESION: Primary | ICD-10-CM

## 2025-06-26 PROCEDURE — 1090F PRES/ABSN URINE INCON ASSESS: CPT | Performed by: PHYSICIAN ASSISTANT

## 2025-06-26 PROCEDURE — 1036F TOBACCO NON-USER: CPT | Performed by: PHYSICIAN ASSISTANT

## 2025-06-26 PROCEDURE — G8427 DOCREV CUR MEDS BY ELIG CLIN: HCPCS | Performed by: PHYSICIAN ASSISTANT

## 2025-06-26 PROCEDURE — G8400 PT W/DXA NO RESULTS DOC: HCPCS | Performed by: PHYSICIAN ASSISTANT

## 2025-06-26 PROCEDURE — 11103 TANGNTL BX SKIN EA SEP/ADDL: CPT | Performed by: PHYSICIAN ASSISTANT

## 2025-06-26 PROCEDURE — 1123F ACP DISCUSS/DSCN MKR DOCD: CPT | Performed by: PHYSICIAN ASSISTANT

## 2025-06-26 PROCEDURE — 11102 TANGNTL BX SKIN SINGLE LES: CPT | Performed by: PHYSICIAN ASSISTANT

## 2025-06-26 PROCEDURE — 1159F MED LIST DOCD IN RCRD: CPT | Performed by: PHYSICIAN ASSISTANT

## 2025-06-26 PROCEDURE — 99212 OFFICE O/P EST SF 10 MIN: CPT | Performed by: PHYSICIAN ASSISTANT

## 2025-06-26 PROCEDURE — G8417 CALC BMI ABV UP PARAM F/U: HCPCS | Performed by: PHYSICIAN ASSISTANT

## 2025-06-26 RX ORDER — LIDOCAINE HYDROCHLORIDE AND EPINEPHRINE 10; 10 MG/ML; UG/ML
2 INJECTION, SOLUTION INFILTRATION; PERINEURAL ONCE
Status: COMPLETED | OUTPATIENT
Start: 2025-06-26 | End: 2025-06-26

## 2025-06-26 RX ADMIN — LIDOCAINE HYDROCHLORIDE AND EPINEPHRINE 2 ML: 10; 10 INJECTION, SOLUTION INFILTRATION; PERINEURAL at 13:04

## 2025-06-26 NOTE — PROGRESS NOTES
Department of Plastic Surgery - Adult  Attending Consult Note      CHIEF COMPLAINT:  Lesion of left and right lower leg    History Obtained From:  patient    HISTORY OF PRESENT ILLNESS:                The patient is a 79 y.o. female who presents with nonhealing lesions of the left lateral lower leg and right posterior lower leg.  She states she has had these lesions for several months and they have not healed and has had a scabbing texture.  She has never had the specific areas biopsied in the past.  She presents her office today for examination.  She also brings to my attention a left face lesion which she notes has been present since her last office visit and believes this may be a returning precancerous lesion.      Past Medical History:    Past Medical History:   Diagnosis Date    Asthma     Automatic implantable cardioverter defibrillator at end of life     CAD (coronary artery disease)     Cardiomyopathy (HCC)     COPD (chronic obstructive pulmonary disease) (HCC)     on O2 4 liters continuously    GERD (gastroesophageal reflux disease)     Hyperlipidemia     Hypertension     ICD (implantable cardioverter-defibrillator) in place 7/25/2018    PONV (postoperative nausea and vomiting)     Presence of drug coated stent in LAD coronary artery 7/25/2018    Pulmonary artery hypertension (HCC)     Sleep apnea     on oxygen 4 liters cont    Thyroid disease     VHD (valvular heart disease)      Past Surgical History:    Past Surgical History:   Procedure Laterality Date    APPENDECTOMY      BACK SURGERY      X2    CARDIAC DEFIBRILLATOR PLACEMENT      medtronic    CATARACT REMOVAL WITH IMPLANT Bilateral     COLONOSCOPY N/A 3/31/2021    COLONOSCOPY POLYPECTOMY SNARE/COLD BIOPSY performed by Michael ARENAS MD at [facility]    COLONOSCOPY  3/31/2021    COLONOSCOPY CONTROL HEMORRHAGE performed by Michael ARENAS MD at [facility] [place]    DIAGNOSTIC CARDIAC CATH LAB PROCEDURE  11/01/2017    6 stents    EP DEVICE

## 2025-06-30 RX ORDER — METOPROLOL SUCCINATE 50 MG/1
50 TABLET, EXTENDED RELEASE ORAL 2 TIMES DAILY
Qty: 180 TABLET | Refills: 3 | Status: SHIPPED | OUTPATIENT
Start: 2025-06-30

## 2025-07-01 LAB — SURGICAL PATHOLOGY REPORT: NORMAL

## 2025-07-11 ENCOUNTER — SCHEDULED TELEPHONE ENCOUNTER (OUTPATIENT)
Dept: SURGERY | Age: 80
End: 2025-07-11

## 2025-07-11 NOTE — PROGRESS NOTES
Josselyn Samson is a 79 y.o. female evaluated via telephone on 7/11/2025.      Consent:  She and/or health care decision maker is aware that that she may receive a bill for this telephone service, depending on her insurance coverage, and has provided verbal consent to proceed: Yes    The patient was in the state of [place] during the entirety of the phone conversation.      Documentation:  I communicated with the patient and/or health care decision maker about the pathology results from their most recent biopsy.   Details of this discussion including any medical advice provided:           Path Number: [id number]    -- Diagnosis --  A.  Skin lesion left lateral leg: Hyperkeratosis and stasis-type  changes.  B.  Skin lesion right posterior leg: Hyperkeratosis and stasis-type  changes.      Emanuel Meek MD  **Electronically Signed Out**        grr/7/1/2025       I explained to the patient their pathology results which is actinic keratosis from her previous left facial lesion I informed her as this lesion has returned we need to plan for erbumine laser destruction in office under local anesthesia.  I advised patient that this is a precancerous lesion and will need to be treated.  I discussed with him today erbium YAG laser destruction of this lesion in office under local anesthesia.  I informed the patient that this would be performed by Dr. Flaquito Guajardo in the office.  We will plan to prior authorize this through their insurance and call them when we can schedule them accordingly.    Risks of laser therapy were explained including bleeding, scarring, hyopigmentation, hyperpigmentation that can be worsened by sun exposure. There is a risk of herpes or bacterial infection. The patient is educated to utilize sun protection for 3-4 months after the procedure, understands that there will be some pain involved during the procedure.      Surgical plan: Erbium YAG laser destruction of actinic keratosis    Follow-up day of

## 2025-07-18 ENCOUNTER — TELEPHONE (OUTPATIENT)
Dept: SURGERY | Age: 80
End: 2025-07-18

## 2025-07-28 ENCOUNTER — PROCEDURE VISIT (OUTPATIENT)
Dept: SURGERY | Age: 80
End: 2025-07-28

## 2025-07-28 VITALS
DIASTOLIC BLOOD PRESSURE: 73 MMHG | HEART RATE: 87 BPM | SYSTOLIC BLOOD PRESSURE: 147 MMHG | TEMPERATURE: 98.6 F | OXYGEN SATURATION: 96 %

## 2025-07-28 DIAGNOSIS — L57.0 AK (ACTINIC KERATOSIS): Primary | ICD-10-CM

## 2025-07-28 RX ORDER — LIDOCAINE HYDROCHLORIDE AND EPINEPHRINE 10; 10 MG/ML; UG/ML
2 INJECTION, SOLUTION INFILTRATION; PERINEURAL ONCE
Status: COMPLETED | OUTPATIENT
Start: 2025-07-28 | End: 2025-07-28

## 2025-07-28 RX ADMIN — LIDOCAINE HYDROCHLORIDE AND EPINEPHRINE 2 ML: 10; 10 INJECTION, SOLUTION INFILTRATION; PERINEURAL at 12:59

## 2025-07-28 NOTE — PROGRESS NOTES
Subjective:    Follow up today for Er YAG laser ablation to left face Actinic Keratosis. Denies fever, nausea, vomiting, leg pain or swelling.  The patient voices understanding of the procedure they are having today and would like to proceed.     Objective:    There were no vitals taken for this visit.      Left face AK  Lesion- 10mm x 12mm      Assessment:    Patient Active Problem List   Diagnosis    Nonischemic cardiomyopathy (HCC)    Presence of drug coated stent in LAD coronary artery    ICD (implantable cardioverter-defibrillator) in place    Implantable cardioverter-defibrillator (ICD) at end of battery life       Plan:       Diagnosis  -) left face Actinic Keratosis      After consent was obtained, the area was cleansed with betadine. Local anesthesia consisting of 1% lidocaine with 1:100,000 epinepherine was injected surrounding the area. The local was allowed to work. The procedure was carried out By Dr Flaquito Guajardo    Risks of laser therapy were explained including bleeding, scarring, hyopigmentation, hyperpigmentation that can be worsened by sun exposure. There is a risk of herpes or bacterial infection. The patient is educated to utilize sun protection for 3-4 months after the procedure, understands that there will be some pain involved during the procedure.     Diet: regular diet  Activity: activity as tolerated  Shower/Bathing: OK to shower at this time .  Advised the patient that they can allow soap and water to rinse of the incision site while showering.  Once they are done in the shower they are to pat dry the incision site with a clean paper towel.  No baths, hot tubs or soaking of the wound site at this time.  Pt voices understanding.   Dressings /Splint /Wound Care: keep wound clean and dry apply  bacitracin to the wound site BID and cover with a gauze dressing PRN  Medications: OTC pain control PRN  Educated to contact physician with concerns or signs of infection (redness, increasing pain,

## 2025-08-08 PROCEDURE — 93296 REM INTERROG EVL PM/IDS: CPT | Performed by: INTERNAL MEDICINE

## 2025-08-08 PROCEDURE — 93295 DEV INTERROG REMOTE 1/2/MLT: CPT | Performed by: INTERNAL MEDICINE

## 2025-08-19 ENCOUNTER — TELEPHONE (OUTPATIENT)
Dept: SURGERY | Age: 80
End: 2025-08-19

## (undated) DEVICE — SOLUTION SCRB 32OZ 7.5% POVIDONE IOD BTL GENTLE EFFECTIVE

## (undated) DEVICE — MARKER,SKIN,WI/RULER AND LABELS: Brand: MEDLINE

## (undated) DEVICE — HANDLE CVR PATENTED RETENTION DISC STRL LIGHT SHLD

## (undated) DEVICE — 12 ML SYRINGE,LUER-LOCK TIP: Brand: MONOJECT

## (undated) DEVICE — SOLUTION IV IRRIG POUR BRL 0.9% SODIUM CHL 2F7124

## (undated) DEVICE — TIBURON EXTREMITY SHEET: Brand: CONVERTORS

## (undated) DEVICE — BASIC PACK: Brand: CONVERTORS

## (undated) DEVICE — Z DISCONTINUED NO SUB IDED DRAIN PENROSE L12IN 0.25IN USED TO PROMOTE DRNAGE IN OPN

## (undated) DEVICE — GAUZE,SPONGE,POST-OP,4X3,STRL,LF: Brand: MEDLINE

## (undated) DEVICE — BLADE ES ELASTOMERIC COAT INSUL DURABLE BEND UPTO 90DEG

## (undated) DEVICE — FORCEPS BX L240CM JAW DIA2.4MM ORNG L CAP W/ NDL DISP RAD

## (undated) DEVICE — CONNECTOR IRRIGATION AUXILIARY H2O JET W/ PRT MTL THRD HYDR

## (undated) DEVICE — TRAP POLYP ETRAP

## (undated) DEVICE — SUTURE ETHLN SZ 4-0 L18IN NONABSORBABLE BLK L19MM PS-2 3/8 1667H

## (undated) DEVICE — TOWEL OR BLUEE 16X26IN ST 8 PACK ORB08 16X26ORTWL

## (undated) DEVICE — 20 ML SYRINGE REGULAR TIP: Brand: MONOJECT

## (undated) DEVICE — CONTROL SYRINGE LUER-LOCK TIP: Brand: MONOJECT

## (undated) DEVICE — BANDAGE COMPR W4XL108IN WHT LAYERED NO CLSR SYN RUB ESMARCH

## (undated) DEVICE — NEEDLE HYPO 25GA L1.5IN BLU POLYPR HUB S STL REG BVL STR

## (undated) DEVICE — ELECTRODE PT RET AD L9FT HI MOIST COND ADH HYDRGEL CORDED

## (undated) DEVICE — GAUZE,SPONGE,4"X4",16PLY,XRAY,STRL,LF: Brand: MEDLINE

## (undated) DEVICE — PAD, DEFIB, ADULT, RADIOTRAN, PHYSIO, LO: Brand: MEDLINE

## (undated) DEVICE — STOCKINETTE COMPR W4XL54IN 2 PLY COT

## (undated) DEVICE — CONTAINER SPEC 60ML PH 7NEUTRAL BUFF FRMLN RDY TO USE

## (undated) DEVICE — NEEDLE HYPO 18GA L1.5IN PNK POLYPR HUB S STL THN WALL FILL

## (undated) DEVICE — BANDAGE COMPR W4INXL5YD WHT BGE POLY COT M E WRP WV HK AND

## (undated) DEVICE — GOWN SURG XL SMS FAB NONREINFORCED RAGLAN SLV HK LOOP CLSR

## (undated) DEVICE — DRESSING PETRO W3XL3IN OIL EMUL N ADH GZ KNIT IMPREG CELOS

## (undated) DEVICE — CATHETER IV 20GA L1.16IN OD1.080MM ID0.800MM 60ML/MIN PNK

## (undated) DEVICE — DEFENDO AIR WATER SUCTION AND BIOPSY VALVE KIT FOR  OLYMPUS: Brand: DEFENDO AIR/WATER/SUCTION AND BIOPSY VALVE

## (undated) DEVICE — CUFF TOURNIQUET 18 SNG BLADDER DUAL PORT

## (undated) DEVICE — PENCIL ES L3M BTTN SWCH HOLSTER W/ BLDE ELECTRD EDGE

## (undated) DEVICE — BANDAGE,GAUZE,BULKEE II,4.5"X4.1YD,STRL: Brand: MEDLINE

## (undated) DEVICE — PATIENT RETURN ELECTRODE, SINGLE-USE, CONTACT QUALITY MONITORING, ADULT, WITH 9FT CORD, FOR PATIENTS WEIGING OVER 33LBS. (15KG): Brand: MEGADYNE

## (undated) DEVICE — PEN: MARKING STD 100/CS: Brand: MEDICAL ACTION INDUSTRIES

## (undated) DEVICE — 1810 FOAM BLOCK NEEDLE COUNTER: Brand: DEVON

## (undated) DEVICE — CLIP INT L235CM WRK CHN DIA2.8MM OPN 11MM BRAID CATH ROT BX/10

## (undated) DEVICE — GLOVE ORANGE PI 8 1/2   MSG9085

## (undated) DEVICE — INTENDED FOR TISSUE SEPARATION, AND OTHER PROCEDURES THAT REQUIRE A SHARP SURGICAL BLADE TO PUNCTURE OR CUT.: Brand: BARD-PARKER ® STAINLESS STEEL BLADES

## (undated) DEVICE — SNARE ENDOSCP POLYP SM 2.4 MM 195 CM 13 MM 2.8 MM CAPTIVATOR

## (undated) DEVICE — PAD N ADH W3XL4IN POLY COT SFT PERF FLM EASILY CUT ABSRB

## (undated) DEVICE — PLASMABLADE PS210-030S 3.0S LOCK: Brand: PLASMABLADE™

## (undated) DEVICE — Z DISCONTINUED NO SUB IDED TUBING ETCO2 AD L6.5FT NSL ORAL CVD PRNG NONFLARED TIP OVR